# Patient Record
Sex: FEMALE | Race: BLACK OR AFRICAN AMERICAN | NOT HISPANIC OR LATINO | Employment: UNEMPLOYED | URBAN - METROPOLITAN AREA
[De-identification: names, ages, dates, MRNs, and addresses within clinical notes are randomized per-mention and may not be internally consistent; named-entity substitution may affect disease eponyms.]

---

## 2023-08-23 ENCOUNTER — TRANSFERRED RECORDS (OUTPATIENT)
Dept: HEALTH INFORMATION MANAGEMENT | Facility: CLINIC | Age: 28
End: 2023-08-23

## 2023-08-23 LAB
ALT SERPL-CCNC: 23 U/L (ref 14–59)
AST SERPL-CCNC: 13 U/L (ref 15–37)
CREATININE (EXTERNAL): 0.94 MG/DL (ref 0.55–1.3)
GLUCOSE (EXTERNAL): 183 MG/DL (ref 70–99)
POTASSIUM (EXTERNAL): 4 MMOL/L (ref 3.5–5.1)

## 2023-11-08 ENCOUNTER — TRANSFERRED RECORDS (OUTPATIENT)
Dept: HEALTH INFORMATION MANAGEMENT | Facility: CLINIC | Age: 28
End: 2023-11-08
Payer: COMMERCIAL

## 2023-11-08 ENCOUNTER — TRANSCRIBE ORDERS (OUTPATIENT)
Dept: MATERNAL FETAL MEDICINE | Facility: CLINIC | Age: 28
End: 2023-11-08
Payer: COMMERCIAL

## 2023-11-08 DIAGNOSIS — O26.90 PREGNANCY RELATED CONDITION, ANTEPARTUM: Primary | ICD-10-CM

## 2023-11-09 DIAGNOSIS — O26.90 PREGNANCY RELATED CONDITION, ANTEPARTUM: Primary | ICD-10-CM

## 2024-01-09 ENCOUNTER — PRE VISIT (OUTPATIENT)
Dept: MATERNAL FETAL MEDICINE | Facility: CLINIC | Age: 29
End: 2024-01-09
Payer: COMMERCIAL

## 2024-01-16 ENCOUNTER — OFFICE VISIT (OUTPATIENT)
Dept: MATERNAL FETAL MEDICINE | Facility: CLINIC | Age: 29
End: 2024-01-16
Attending: OBSTETRICS & GYNECOLOGY
Payer: COMMERCIAL

## 2024-01-16 ENCOUNTER — HOSPITAL ENCOUNTER (OUTPATIENT)
Dept: ULTRASOUND IMAGING | Facility: CLINIC | Age: 29
Discharge: HOME | End: 2024-01-16
Attending: OBSTETRICS & GYNECOLOGY
Payer: COMMERCIAL

## 2024-01-16 ENCOUNTER — TRANSFERRED RECORDS (OUTPATIENT)
Dept: HEALTH INFORMATION MANAGEMENT | Facility: CLINIC | Age: 29
End: 2024-01-16

## 2024-01-16 VITALS
HEART RATE: 87 BPM | OXYGEN SATURATION: 99 % | SYSTOLIC BLOOD PRESSURE: 137 MMHG | DIASTOLIC BLOOD PRESSURE: 83 MMHG | RESPIRATION RATE: 18 BRPM

## 2024-01-16 DIAGNOSIS — O26.90 PREGNANCY RELATED CONDITION, ANTEPARTUM: ICD-10-CM

## 2024-01-16 DIAGNOSIS — J45.909 ASTHMA AFFECTING PREGNANCY IN SECOND TRIMESTER: ICD-10-CM

## 2024-01-16 DIAGNOSIS — O99.512 ASTHMA AFFECTING PREGNANCY IN SECOND TRIMESTER: ICD-10-CM

## 2024-01-16 DIAGNOSIS — O99.212 OBESITY AFFECTING PREGNANCY IN SECOND TRIMESTER, UNSPECIFIED OBESITY TYPE: Primary | ICD-10-CM

## 2024-01-16 PROCEDURE — 99204 OFFICE O/P NEW MOD 45 MIN: CPT | Mod: 25 | Performed by: OBSTETRICS & GYNECOLOGY

## 2024-01-16 PROCEDURE — 99213 OFFICE O/P EST LOW 20 MIN: CPT | Mod: 25 | Performed by: OBSTETRICS & GYNECOLOGY

## 2024-01-16 PROCEDURE — 76811 OB US DETAILED SNGL FETUS: CPT

## 2024-01-16 PROCEDURE — 76811 OB US DETAILED SNGL FETUS: CPT | Mod: 26 | Performed by: OBSTETRICS & GYNECOLOGY

## 2024-01-16 RX ORDER — ALBUTEROL SULFATE 1.25 MG/3ML
1.25 SOLUTION RESPIRATORY (INHALATION) EVERY 6 HOURS PRN
COMMUNITY

## 2024-01-16 ASSESSMENT — PAIN SCALES - GENERAL: PAINLEVEL: NO PAIN (0)

## 2024-01-16 NOTE — PROGRESS NOTES
"  Maternal-Fetal Medicine Consultation    Gabby Phipps  : 1995  MRN: [id number]    REFERRAL:  Gabby Phipps is a 28 year old sent by KEVIN Johnson from [facility] for MFM consultation.    HPI:  Gabby Phipps is a 28 year old  at 22w3d by LMP consistent with 11w0d US here for MFM consultation for BMI 65, tobacco use, A1C 6.1 2023, history of tracheostomy, history of chronic hypertension but normotensive since weight loss, asthma with Albuterol inhaler.    She is here with her friend.    Patient reports that in her last pregnancy she did not gain weight until \"month 7\" at which time she gained 100 lbs.  She states that prior to that pregnancy she had lost weight, was previously 500 lb.  Notes weight has largely been stable since that time.  She states she had chronic hypertension or elevated BP in the past with higher weight but that she has been normotensive since weight loss from 500 lb.  Of note, in review of records, patient with mild range BP noted in the past including during 2020 pregnancy.   admission records also with note of hypertension.    In her first pregnancy she had 39 week full term delivery but unfortunately required  section due to arrest of descent after pushing for 3 hours.  She underwent PCS under general anesthesia due to difficulty with epidural.  She was discharged home on POD#3.  Of note, she reports a H1N1 infection in  requiring tracheostomy for 2-3 months.  States she has not had evaluation of her airway since tracheostomy and does not follow with ENT.    Patient with elevated A1C of 6.1 on 2023.  States she has not been diagnosed with diabetes and that she passed an early 1 hr GCT although this record is not available for review at this time.    Denies vaginal bleeding, LOF, pain.  Denies shortness of breath or difficulty breathing.  No additional concerns.  Does have an inhaler to use.  No recent PFTs. Planning on having repeat " " section.    Prenatal Care:  Primary OB care this pregnancy has been with [facility]    Obstetrics History:  OB History    Para Term  AB Living   2 1 1 0 0 1   SAB IAB Ectopic Multiple Live Births   0 0 0 0 1      # Outcome Date GA Lbr Elvin/2nd Weight Sex Delivery Anes PTL Lv   2 Current            1 Term 20 39w3d 13:19 / 02:30 4.14 kg (9 lb 2 oz) M CS-LTranv Gen, EPI  GABBI      Name: BETTYE BERRIOS      Apgar1: 5  Apgar5: 6     Gynecologic History:  - Last Pap: \"negative\" per outside records unknown date  - Denies any history of abnormal pap smears  - Denies prior cervical surgery or procedures  - Denies any history of frequent UTIs, vaginal infections, or STIs    Past Medical History:  Past Medical History:   Diagnosis Date    Chronic hypertension     H/O tracheostomy     Obesity     Pre-diabetes     Tobacco use      Past Surgical History:  Past Surgical History:   Procedure Laterality Date     SECTION      TRACHEOSTOMY       Current Medications:  Current Outpatient Medications   Medication Instructions    albuterol (ACCUNEB) 1.25 mg, Nebulization, EVERY 6 HOURS PRN     Allergies:  Amoxicillin    Social History:   Use of tobacco.  Denies use of alcohol, drugs.    Family History:  Denies history of genetic disorders, preeclampsia, thromboembolic disease, bleeding disorders.    ROS:  10-point ROS negative except as in HPI     PHYSICAL EXAM:  Vitals:    24 1441   BP: 137/83   BP Location: Right arm   Patient Position: Sitting   Cuff Size: Adult Large   Pulse: 87   Resp: 18   SpO2: 99%     Ultrasound:  Please see imaging tab for today's US report.    ASSESSMENT/PLAN:  Gabby Phipps is a 28 year old  at 22w3d by LMP consistent with 11w0d US here for MFM consultation for BMI 65, tobacco use, A1C 6.1 2023, history of tracheostomy, history of chronic hypertension but normotensive since weight loss, asthma with Albuterol inhaler.    BMI 65  Pregnancy complications " are increased in patients with obesity, even in the absence of additional comorbidity. These risks include, but are not limited to, gestational diabetes, hypertensive disorders of pregnancy, depression and anxiety, VTE,  birth, failed induction of labor,  delivery, operative delivery, postpartum hemorrhage, postoperative infection, subfertility, miscarriage, congenital anomaly, stillbirth, large for gestational age, shoulder dystocia, postpartum weight retention, and difficulty with breastfeeding.  The risk of antepartum stillbirth increases with increasing BMI and gestational age. Women with BMI ? 30 are less likely than their normal weight counterparts to enter spontaneous labor.  For these reasons, determining the optimal timing of delivery for these patients becomes a balance of decreasing preventable stillbirth risk with minimizing  delivery risk.  This balance is further complicated by the fact that women with BMI ? 30 are more likely to have a failed induction of labor than normal weight women.     Venous thromboembolism in pregnancy is responsible for up to 9-10% of maternal deaths in developed countries, with many of these deaths (> 90%) having an element of preventability. Obesity is certainly a risk factor for DVT throughout pregnancy, increasing risk between 2.5-3 times. Throughout pregnancy, risk for VTE is highest during times of immobility, such as surgery or trauma, and antepartum admission.  Risk is highest after delivery and remains high in the 6 weeks postpartum.  Upon admission to the hospital during pregnancy for non-delivery indications, women should undergo risk assessment for VTE within 24 hours.  Antepartum admission during pregnancy confers an 18 fold risk of VTE over baseline in otherwise low risk women, which approaches the risk of women with a high risk thrombophilia or prior VTE.  Due to this increased risk, VTE prophylaxis is recommended.  For women with BMI ?  30, and for whom admission of at least 72 hours is anticipated, mechanical prophylaxis while in bed along with maintenance of mobility is encouraged.  Pharmacologic prophylaxis is also recommended in this population.     Obstructive sleep apnea, a serious condition which is tied to metabolic syndrome and its associated conditions, is common in the obstetric population with an incidence of 11-20%.  Pregnant women with a BMI ? 30 are 13 times more likely to be diagnosed with obstructive sleep apnea than their normal weight counterparts.  Although the risks of RODRIGO are often associated with health problems later in life, multiple studies have shown an increased risk for severe  morbidity and mortality, both maternal and fetal/.  These risks include pre-eclampsia, gestational diabetes, post-operative wound complications, acute renal failure, pulmonary edema, pulmonary emboli (OR 14), peripartum cardiomyopathy (OR 19), stroke, and in hospital death (OR 7), and a trend toward fetal growth restriction.  It has been recommended in the literature to screen all women with BMI ?30 for symptoms of sleep apnea. The pregnancy specific model and STOP questions have reasonable efficacy as screening tools.     BMI >50 affects stillbirth differently compared with lower BMI.  The rate at which risk for stillbirth increased for overweight and Class I and II obesity are linear from 30-42 weeks gestation.  In contrast, among class III obesity and BMI > 50, the risks appear to be nonlinear with time; the risk of stillbirth escalates faster with increasing gestational age in these extreme classes.  These findings remained consistent after adjustment for known confounding risk factors such as chronic hypertension and pre-gestational diabetes.     Recommendations:  Screening for obstructive sleep apnea (RODRIGO) should be performed. If positive, expedited referral for polysomnography with sleep medicine should be  provided.  Advise weight gain of 11-20 pounds during pregnancy. Provide counseling on exercise and nutrition in pregnancy including 20-30 minutes of moderate exercise most days of the week.  Weight gain should be documented at each prenatal visit.   Weekly  surveillance starting at 34 weeks gestation  Pharmacologic anticoagulation should be initiated if admitted antepartum for an expected stay of 72 hours or greater (Lovenox 40 mg subcutaneous every 12 hours).  If  delivery occurs, patients should receive pharmacologic anticoagulation within 24 hours of delivery for the course of hospitalization.    Tobacco use  Tobacco use in pregnancy is associated with many risks to mother and fetus.  In addition to the better-known risks to the patient, including cardiovascular and pulmonary risks, the offspring of patients who smoke in pregnancy are at higher risk of fetal growth restriction, placental abruption, stillbirth, asthma, and increased rates of sudden infant death syndrome (SIDS).  We recommend complete cessation of tobacco use in pregnancy.  The use of pharmacotherapy to assist in smoking cessation can be considered.  Nicotine replacement therapy is recommended, but has not yet been shown to be effective in the pregnant population in limited studies.  Buproprion may also be used to aide in cessation, however there are conflicting studies regarding its association with birth defects, therefore adequate counseling should be performed prior to initiation.    Recommendations:  Continue to encourage complete smoking cessation.  Use of nicotine replacement therapy as clinically indicated  Referral to quit hotline (5-586-RJHMPZM), group therapy, or support provider.    Pre-diabetes (Hgb A1C 6.1)  Patient reports normal early 1 hr GCT.  Updated prenatal records were requested for review.     Recommendations:  Repeat 1 hr GCT assessment at 24 weeks.    History of tracheostomy  Asthma with Albuterol  inhaler  Asthma is a very common potentially serious medical complication of pregnancy.  Those with severe asthma are at the greatest risk for exacerbations and complications during pregnancy. Severe and poorly controlled asthma may be associated with increased risks of  birth, need for  delivery, preeclampsia, growth restriction, and maternal morbidity and mortality.  In general, the risk of asthma exacerbation is much greater to both mother and fetus than the risk of medication exposure.  Therefore medications should be continued as prescribed to optimize the patient s pulmonary status.      Recommendations:  Continue Albuterol  Recommend baseline PFTs  Avoid any known asthma triggers.  Influenza immunization (October to March).  Avoid prostaglandins such as E2 (Prostin) and F2-alpha (carboprost/Hemabate).  Anesthesia consult, this was placed today  Referral to ENT for assessment of airway given history of tracheostomy, this was placed today  Transfer of care to [facility] at around 28 weeks gestation in order to expedite anesthesia and ENT multidisciplinary care    History of chronic hypertension but normotensive since weight loss  Patients with chronic hypertension are more likely to develop preeclampsia during the pregnancy, with a risk of 20-50%.  Women with chronic hypertension are at increased risk for early-onset preeclampsia.  Low dose aspirin has been used to lower this risk.  Chronic hypertension also increases the risk of maternal stroke, pulmonary edema, renal failure, gestational diabetes, iatrogenic  birth, fetal growth restriction, placental abruption and  mortality rate including stillbirth.  Without baseline laboratory assessment, it may be difficult to distinguish an exacerbation of hypertension from preeclampsia, especially in the third trimester. We reviewed that it is generally anticipated that blood pressure will gradually decrease during early  pregnancy, reaching at edilberto at 28-32 weeks, and then slowly rise to pre-pregnancy levels.     We reviewed the goals of antihypertensive therapy in pregnancy, specifically there is new data to suggest that improved blood pressure control during pregnancy reduces the risk of developing superimposed preeclampsia with severe features, abruption,  delivery < 35 weeks, and fetal/ death without an increased risk of fetal growth restriction/small for gestation age infants. Based on these findings it is recommended that women be initiated on medications, prior to 20 weeks, to keep blood pressures < 140/90 mmHg both pre pregnancy and throughout gestation. We generally recommend home blood pressure monitoring to assist with medication titration as well as to monitor her for the development of preeclampsia.  In the event of new elevations in blood pressures after 20 weeks gestation, we would recommend thorough evaluation for preeclampsia prior to medication dose escalation. We reviewed the relative safety of various antihypertensive classes of medications during pregnancy. Labetalol or long-acting nifedipine safe options for blood pressure control in pregnancy.     Recommendations:  Initiate necessary medications and titrate as needed to achieve blood pressure goal <140/90  Recommend initiating low dose aspirin 81 mg daily for preeclampsia prevention  Baseline studies:   Electrocardiogram, if abnormal this should be followed up with an echocardiogram  Liver function tests, creatinine and blood urea nitrogen, serum electrolytes  Spot protein/creatinine ratio not reports in outside records available for review  If the protein/creatine ratio is greater than 0.15 it should be followed up with a 24 hour urine for protein and creatinine  Close monitoring evidence of superimposed preeclampsia  More frequent blood pressure monitoring; she should check her blood pressure weekly until 32 weeks at which point she should  check it two times per week  Her blood pressure cuff should be titrated in the office  More frequent prenatal visits are indicated with hypertension when medication modifications are necessary  Calling parameters should be clearly laid out with regards to blood pressure and symptoms  Repeat labs as clinically indicated, with a low threshold to rule-out superimposed preeclampsia, especially if it is thought that medication may need to be increased  Ultrasounds for growth monthly starting at 28 weeks gestation.   fetal surveillance with weekly BPP (or NST and MVP) starting at 32 weeks IF ON MEDICATION otherwise weekly BPP at 34 weeks for BMI.  Delivery at 38-39 weeks (unless poorly controlled or otherwise indicated sooner)  Early postpartum visit (within the first week) for blood pressure assessment  Postpartum, medication should be adjusted to maintain the systolic blood pressure below 140  mm Hg and the diastolic blood pressure below 90 mm Hg    Recommend transfer of care to [facility] at 28 weeks.  Anesthesia and ENT referrals placed today to expedite multi-disciplinary care upon transfer of care.    The patient was seen and evaluated with Dr. Morris    Thank you for allowing us to participate in the care of your patient. Please do not hesitate to contact us if you have further questions regarding the management of your patient.     Kateryna Strickland MD   Maternal-Fetal Medicine Fellow, PGY7  2024 4:26 PM      Physician Attestation   I saw this patient with the resident and agree with the resident/fellow's findings and plan of care as documented in the note.      47 MINUTES SPENT BY ME on the date of service doing chart review, history, exam, documentation & further activities per the note.    Andree Morris MD  Date of Service (when I saw the patient): 24

## 2024-01-16 NOTE — NURSING NOTE
Gabby seen in clinic today at 22w3d gestation for L2/MFM Consult d/t BMI 65, hx H1N1 with hospitalization and trach. Pt here with friend. VS obtained. Meds and allergies reviewed. Patient reports some early fetal movement, denies pain, denies contractions/pre-term labor, leaking of fluid, or bleeding. Patient denies headache, visual changes, nausea/vomiting, epigastric pain related to preeclampsia. Dr. Strickland and Dr. Morris met with pt and discussed POC, see separate note. Plan for RL2 in 3 weeks for subopt anatomy and growth. Future visits scheduled at . Pt discharged stable and ambulatory.      Rachele Fagan RN

## 2024-01-26 ENCOUNTER — TELEPHONE (OUTPATIENT)
Dept: MATERNAL FETAL MEDICINE | Facility: CLINIC | Age: 29
End: 2024-01-26
Payer: COMMERCIAL

## 2024-01-26 NOTE — TELEPHONE ENCOUNTER
LM for pt to call RN coordinator at [facility] at [phone]. Pt needs to make an appointment with ENT for airway evaluation per [facility] consult on 1/16/24. Pt can call [phone] to schedule ENT appt. May need to place new referral.       Mikki Aviles RN

## 2024-02-22 ENCOUNTER — TRANSFERRED RECORDS (OUTPATIENT)
Dept: HEALTH INFORMATION MANAGEMENT | Facility: CLINIC | Age: 29
End: 2024-02-22
Payer: COMMERCIAL

## 2024-02-28 ENCOUNTER — TELEPHONE (OUTPATIENT)
Dept: PULMONOLOGY | Facility: CLINIC | Age: 29
End: 2024-02-28

## 2024-02-28 ENCOUNTER — OFFICE VISIT (OUTPATIENT)
Dept: MATERNAL FETAL MEDICINE | Facility: CLINIC | Age: 29
End: 2024-02-28
Attending: OBSTETRICS & GYNECOLOGY
Payer: COMMERCIAL

## 2024-02-28 ENCOUNTER — HOSPITAL ENCOUNTER (OUTPATIENT)
Dept: ULTRASOUND IMAGING | Facility: CLINIC | Age: 29
Discharge: HOME | End: 2024-02-28
Attending: OBSTETRICS & GYNECOLOGY
Payer: COMMERCIAL

## 2024-02-28 DIAGNOSIS — O99.512 ASTHMA AFFECTING PREGNANCY IN SECOND TRIMESTER: ICD-10-CM

## 2024-02-28 DIAGNOSIS — J45.909 ASTHMA AFFECTING PREGNANCY IN SECOND TRIMESTER: ICD-10-CM

## 2024-02-28 DIAGNOSIS — O99.210 OBESITY AFFECTING PREGNANCY, ANTEPARTUM, UNSPECIFIED OBESITY TYPE: Primary | ICD-10-CM

## 2024-02-28 DIAGNOSIS — O26.90 PREGNANCY RELATED CONDITION, ANTEPARTUM: ICD-10-CM

## 2024-02-28 DIAGNOSIS — D62 ANEMIA DUE TO ACUTE BLOOD LOSS: ICD-10-CM

## 2024-02-28 DIAGNOSIS — E66.01 MORBID OBESITY (H): Primary | ICD-10-CM

## 2024-02-28 DIAGNOSIS — O99.210 OBESITY AFFECTING PREGNANCY, ANTEPARTUM, UNSPECIFIED OBESITY TYPE: ICD-10-CM

## 2024-02-28 PROBLEM — O47.1 FALSE LABOR AT OR AFTER 37 COMPLETED WEEKS OF GESTATION: Status: ACTIVE | Noted: 2020-07-22

## 2024-02-28 PROCEDURE — 76816 OB US FOLLOW-UP PER FETUS: CPT | Mod: 26 | Performed by: OBSTETRICS & GYNECOLOGY

## 2024-02-28 PROCEDURE — 76816 OB US FOLLOW-UP PER FETUS: CPT

## 2024-02-28 NOTE — PROGRESS NOTES
"Please see \"Imaging\" tab under \"Chart Review\" for details of today's US at the [facility].    Stone Grullon MD  Maternal-Fetal Medicine    "

## 2024-02-28 NOTE — TELEPHONE ENCOUNTER
FUTURE VISIT INFORMATION      SURGERY INFORMATION:  / high risk delivery not yet scheduled   Consult: ov 24    RECORDS REQUESTED FROM:         Most recent EKG+ Tracin24    Most recent PFT's: 24

## 2024-02-28 NOTE — NURSING NOTE
Patient reports good fetal movement, denies contractions, leaking of fluid, or bleeding.  SBAR given to MFM MD, see their note in Epic.    Spoke with Faiza MATA, PCC who will start to work to coordinate DEMIAN for patient to MFM along with coordination of multiple recommendations made in MFM consult. Patient aware she will be hearing from PCC's to arrange future visits.

## 2024-03-01 ENCOUNTER — TELEPHONE (OUTPATIENT)
Dept: PULMONOLOGY | Facility: CLINIC | Age: 29
End: 2024-03-01
Payer: COMMERCIAL

## 2024-03-04 ENCOUNTER — TRANSFERRED RECORDS (OUTPATIENT)
Dept: HEALTH INFORMATION MANAGEMENT | Facility: CLINIC | Age: 29
End: 2024-03-04
Payer: COMMERCIAL

## 2024-03-04 ENCOUNTER — TELEPHONE (OUTPATIENT)
Dept: PULMONOLOGY | Facility: CLINIC | Age: 29
End: 2024-03-04
Payer: COMMERCIAL

## 2024-03-04 LAB
ALT SERPL-CCNC: 20 U/L (ref 14–59)
AST SERPL-CCNC: 13 U/L (ref 15–37)
CREATININE (EXTERNAL): 0.79 MG/DL (ref 0.55–1.3)
GFR ESTIMATED (EXTERNAL): 105 ML/MIN/1.73

## 2024-03-04 NOTE — TELEPHONE ENCOUNTER
Called and spoke to pt to rescheduled appt with Dr Velez on 4/15 to a sooner date, per pt request due to her pregnancy. Patient has been rescheduled to 3/22 with PFT prior, holding off on chest XR for now due to pt's pregnancy and message has been routed to Dr Dan.

## 2024-03-07 ENCOUNTER — PRE VISIT (OUTPATIENT)
Dept: SURGERY | Facility: CLINIC | Age: 29
End: 2024-03-07

## 2024-03-08 NOTE — CONFIDENTIAL NOTE
RECORDS RECEIVED FROM: internal    DATE RECEIVED: 3.22.24    NOTES STATUS DETAILS   OFFICE NOTE from referring provider internal    Stone Grullon MD      OFFICE NOTE from other specialist internal  1.16.24 [facility]    MEDICATION LIST internal     TESTS     PULMONARY FUNCTION TESTING (PFT) internal  Scheduled 3.22.24

## 2024-03-10 ENCOUNTER — HEALTH MAINTENANCE LETTER (OUTPATIENT)
Age: 29
End: 2024-03-10

## 2024-03-11 ENCOUNTER — OFFICE VISIT (OUTPATIENT)
Dept: SURGERY | Facility: CLINIC | Age: 29
End: 2024-03-11
Attending: OBSTETRICS & GYNECOLOGY
Payer: COMMERCIAL

## 2024-03-11 ENCOUNTER — OFFICE VISIT (OUTPATIENT)
Dept: MATERNAL FETAL MEDICINE | Facility: CLINIC | Age: 29
End: 2024-03-11
Attending: OBSTETRICS & GYNECOLOGY
Payer: COMMERCIAL

## 2024-03-11 ENCOUNTER — PRE VISIT (OUTPATIENT)
Dept: SURGERY | Facility: CLINIC | Age: 29
End: 2024-03-11

## 2024-03-11 ENCOUNTER — ANESTHESIA EVENT (OUTPATIENT)
Dept: SURGERY | Facility: CLINIC | Age: 29
End: 2024-03-11

## 2024-03-11 VITALS
RESPIRATION RATE: 16 BRPM | SYSTOLIC BLOOD PRESSURE: 123 MMHG | WEIGHT: 293 LBS | BODY MASS INDEX: 45.99 KG/M2 | TEMPERATURE: 97.8 F | HEIGHT: 67 IN | OXYGEN SATURATION: 97 % | HEART RATE: 92 BPM | DIASTOLIC BLOOD PRESSURE: 86 MMHG

## 2024-03-11 VITALS
SYSTOLIC BLOOD PRESSURE: 123 MMHG | OXYGEN SATURATION: 97 % | WEIGHT: 293 LBS | DIASTOLIC BLOOD PRESSURE: 86 MMHG | HEART RATE: 95 BPM | RESPIRATION RATE: 18 BRPM

## 2024-03-11 DIAGNOSIS — O99.210 OBESITY AFFECTING PREGNANCY, ANTEPARTUM, UNSPECIFIED OBESITY TYPE: ICD-10-CM

## 2024-03-11 DIAGNOSIS — O99.512 ASTHMA AFFECTING PREGNANCY IN SECOND TRIMESTER: ICD-10-CM

## 2024-03-11 DIAGNOSIS — O09.93 SUPERVISION OF HIGH RISK PREGNANCY IN THIRD TRIMESTER: Primary | ICD-10-CM

## 2024-03-11 DIAGNOSIS — O26.90 PREGNANCY RELATED CONDITION, ANTEPARTUM: ICD-10-CM

## 2024-03-11 DIAGNOSIS — J45.909 ASTHMA AFFECTING PREGNANCY IN SECOND TRIMESTER: ICD-10-CM

## 2024-03-11 PROCEDURE — G0463 HOSPITAL OUTPT CLINIC VISIT: HCPCS | Performed by: ADVANCED PRACTICE MIDWIFE

## 2024-03-11 PROCEDURE — 99204 OFFICE O/P NEW MOD 45 MIN: CPT | Performed by: PHYSICIAN ASSISTANT

## 2024-03-11 PROCEDURE — 99215 OFFICE O/P EST HI 40 MIN: CPT | Performed by: ADVANCED PRACTICE MIDWIFE

## 2024-03-11 RX ORDER — ALBUTEROL SULFATE 90 UG/1
1-2 AEROSOL, METERED RESPIRATORY (INHALATION) EVERY 4 HOURS PRN
COMMUNITY
Start: 2024-01-31

## 2024-03-11 ASSESSMENT — PATIENT HEALTH QUESTIONNAIRE - PHQ9: SUM OF ALL RESPONSES TO PHQ QUESTIONS 1-9: 0

## 2024-03-11 ASSESSMENT — PAIN SCALES - GENERAL
PAINLEVEL: NO PAIN (0)
PAINLEVEL: NO PAIN (0)

## 2024-03-11 ASSESSMENT — LIFESTYLE VARIABLES: TOBACCO_USE: 1

## 2024-03-11 NOTE — PROGRESS NOTES
"Maternal fetal Medicine OB Follow up visit.     Gabby Phipps  : 1995  MRN: [id number]    CC: OB Follow-up    Subjective:  Gabby Phipps is a 28 year old  at 30w2d presenting for routine OB follow-up. Today, she is feeling well. She offers no OB concerns at this time. Patient denies regular, painful contractions, denies loss of fluid or vaginal bleeding. Reports fetal movement.      Has anesthesia consult later this afternoon. Is aware of pulmonology visits that are upcoming, but hasn't heard from ENT for the recent referral.    She is , but patient reports that her  is not currently involved. Unsure if he will be there for the birth or if she will have an additional support person.     She passed her early glucose testing and reports that she did complete routine testing at her local OB office in [place], but does not think she has been told the results.     OB Hx:  OB History    Para Term  AB Living   2 1 1 0 0 1   SAB IAB Ectopic Multiple Live Births   0 0 0 0 1      # Outcome Date GA Lbr Elvin/2nd Weight Sex Delivery Anes PTL Lv   2 Current            1 Term 20 39w3d 13:19 / 02:30 4.14 kg (9 lb 2 oz) M CS-LTranv Gen, EPI  GABBI      Name: BETTYE BERRIOS      Apgar1: 5  Apgar5: 6         Objective:  /86 (BP Location: Left arm, Patient Position: Sitting, Cuff Size: Adult Large)   Pulse 95   Resp 18   Wt (!) 182.7 kg (402 lb 12.8 oz)   LMP 2023   SpO2 97%   Gen: alert, oriented, NAD  Skin: warm, dry, intact.  Respiratory: breathing unlabored, no SOB, scar tissue noted at previous tracheostomy site  Abdominal: gravid, non-tender, FHTs: 155  Pelvic: deferred  Extremities: WNL  Psych: mood WNL, behavior WNL      OB Ultrasound:  Please see \"imaging\" tab under chart review for today's ultrasound results.      Assessment/Plan:  28 year old  at 30w2d here for follow up OB visit.    Pregnancy has been complicated by:   - Elevated BMI  - Remote " hx cHTN  - Asthma  - Current smoker  - THC use   - Pre-diabetes  - Elevated GCT   - History of tracheostomy       Transfer of care:  - Oriented patient to [facility] practice. Reviewed that [facility] is a multidisciplinary institution and her care will be collaborative in fashion with [facility] doctors, CNM, residents, fellows, and [facility] clinic for intrapartum management.     Elevated BMI:  Hx of cHTN:  - Dx with cHTN when at a higher weight (500lbs). Reports BP have become normotensive with previous weight loss.   - Baseline preE Labs: WNL. No UPC on file.  - Low dose ASA was previously recommended, however patient not taking.  - Previously recommended for RODRIGO evaluation. Will ask patient at next visit if this occurred at local medical provider or if she has interest in [facility] referral.  - ECG in clinic today. Reviewed by Dr. Harris and WNL for pregnancy.     Smoking and THC use:  - Currently smokes half PPD.  - THC use most days.  - Encouraged cessation. At upcoming visit: will need to review that after birth, baby will undergo routine screening given patient history of use during pregnancy.     Pre-diabetes:  Elevated GCT:  - Results obtained from outside clinic and are elevated. Orders at previous OB clinic (in [place]) have been placed and patient will plan to complete GTT for patient convenience given her distance from [place].  - Elevated A1c in 8/2023 of 6.1, but patient reports never being diagnosed with diabetes.     Asthma:  History of tracheostomy:  - Asthma well-controlled and not requiring frequent inhaler use. Albuterol PRN.  - Anesthesia consult after OB visit today.  - ENT referral previously given. Phone number given to patient today to schedule appointment.     Routine PNC:  - Prenatal labs:  Rh: +  antibody: negative   HepB/HIV/RPR/HepC: nonreactive   GC/CT: negative   Rubella: equivocal - recommend MMR vaccine postpartum     GCT: failed  GTT: planning to complete at local OB office  - Pap smear:  "\"negative\" (23) - per outside records, but unable to see official lab value.  - Immunizations:  s/p TDap and Flu  - GBS in urine at [facility]     Surveillance:  - Serial growth US.  - Weekly BPPs starting at 32 weeks ONLY if medications are needed for cHTN. Otherwise will begin weekly BPPs at 34 weeks for BMI.    Delivery planning:  - Planning repeat c/s at term.   - Feeding: needs to be discussed  - Contraception plans: Patient unsure if this will be her last pregnancy. Discussed r/b/a to sterilization. Given that she has state insurance, she is open to signing federal tubal papers. Plan to do this at next OB visit.        40 minutes spent on the date of the encounter, doing chart review, history and exam, documentation and further activities as noted.      Melly Morgan CNM on 3/11/2024 at 12:27 PM      "

## 2024-03-11 NOTE — CONSULTS
Anesthesia Consult Note      Reason for consult:   High Risk OB consult  History of tracheostomy  Morbid obesity    Date of Encounter: 3/11/2024  Referring Provider: Melly Morgan CNM   Primary Care Physician:  Marsha Ref-Primary, Physician      ROBBIN METZGER Moise is a 28 year old woman who is currently  who is 30w2d gestation. She is being seen in our clinic for high risk OB consult due to past medical history of morbid obesity, tobacco use, prediabetes, history of H1N1 in  with hospital course notable for tracheostomy and ECMO.     She is pregnant with her second baby and overall feels that things are going well. She is planning for a repeat  section but timing has yet to be determined. She had an appointment with [facility] earlier today.     OB Hx:       /parity:      History of complications of pregnancy  No previous pregnancy complications or first pregnancy    History of obstetrical surgery  In her first pregnancy she had 39 week full term delivery but required  section due to arrest of descent after pushing for 3 hours.  She underwent PCS under general anesthesia due to difficulty with epidural.  She was discharged home on POD#3.    History of bleeding/coagulopathy  Denies    History of anesthesia issues in patient or 1st degree relative  No previous issues with anesthesia for the patient or a first degree relative    History is obtained from the patient.     Past Medical History  Past Medical History:   Diagnosis Date    Chronic hypertension     H/O tracheostomy     H1N1 influenza 2009    Obesity     Pre-diabetes     Tobacco use        Past Surgical History  Past Surgical History:   Procedure Laterality Date     SECTION      TRACHEOSTOMY         Prior to Admission Medications  Current Outpatient Medications   Medication Sig Dispense Refill    albuterol (ACCUNEB) 1.25 MG/3ML neb solution Take 1.25 mg by nebulization every 6 hours as needed for  shortness of breath, wheezing or cough      VENTOLIN  (90 Base) MCG/ACT inhaler Inhale 1-2 puffs into the lungs every 4 hours as needed (Patient not taking: Reported on 3/11/2024)         Menstrual history: Patient's last menstrual period was 08/12/2023.     Allergies  Allergies   Allergen Reactions    Amoxicillin Hives       Social History  Social History     Socioeconomic History    Marital status:      Spouse name: Not on file    Number of children: Not on file    Years of education: Not on file    Highest education level: Not on file   Occupational History    Not on file   Tobacco Use    Smoking status: Every Day     Packs/day: 0.50     Years: 12.00     Additional pack years: 0.00     Total pack years: 6.00     Types: Cigarettes    Smokeless tobacco: Never   Substance and Sexual Activity    Alcohol use: Not Currently    Drug use: Yes     Frequency: 7.0 times per week     Types: Marijuana     Comment: daily use    Sexual activity: Yes     Partners: Male     Birth control/protection: None   Other Topics Concern    Not on file   Social History Narrative    Not on file     Social Determinants of Health     Financial Resource Strain: Not on file   Food Insecurity: Not on file   Transportation Needs: Not on file   Physical Activity: Not on file   Stress: Not on file   Social Connections: Not on file   Interpersonal Safety: Not on file   Housing Stability: Not on file       Family History  Family History   Problem Relation Age of Onset    Deep Vein Thrombosis (DVT) Mother     Anesthesia Reaction No family hx of        The complete review of systems is negative other than noted in the HPI or here. Anesthesia Evaluation   Pt has had prior anesthetic.     No history of anesthetic complications       ROS/MED HX  ENT/Pulmonary: Comment: Hx of H1N1 requiring tracheostomy 2009    (+)     RODRIGO risk factors, snores loudly, hypertension, obese,        tobacco use, Current use,                    (-) asthma and  "recent URI   Neurologic:  - neg neurologic ROS     Cardiovascular: Comment: Hx of ECMO 2009    (+)  hypertension- -   -  - -                                      METS/Exercise Tolerance: 4 - Raking leaves, gardening Comment: Walks up 3 flights of stairs every day to get to her apartment   Hematologic:  - neg hematologic  ROS     Musculoskeletal:  - neg musculoskeletal ROS     GI/Hepatic:  - neg GI/hepatic ROS     Renal/Genitourinary:  - neg Renal ROS     Endo: Comment: Prediabetes    (+)               Obesity,       Psychiatric/Substance Use:  - neg psychiatric ROS     Infectious Disease:  - neg infectious disease ROS     Malignancy:  - neg malignancy ROS     Other:              /86 (BP Location: Right arm, Patient Position: Sitting, Cuff Size: Thigh)   Pulse 92   Temp 97.8  F (36.6  C) (Oral)   Resp 16   Ht 1.702 m (5' 7\")   Wt (!) 182.7 kg (402 lb 12.5 oz)   LMP 08/12/2023   SpO2 97%   Breastfeeding No   BMI 63.08 kg/m      Physical Exam  Constitutional: Pleasant, well-appearing female, no apparent distress, and appears stated age.  Eyes: Pupils equal, round and reactive to light, extra ocular muscles intact, sclera clear, conjunctiva normal.  HENT: Normocephalic and atraumatic, oral pharynx with moist mucus membranes, good dentition. Thick neck.  Respiratory: Clear to auscultation bilaterally, no crackles or wheezing.  Cardiovascular: Regular rate and rhythm, normal S1 and S2, and no murmur noted.  No edema. Palpable pulses to radial  DP and PT arteries.   GI: Morbidly obese.  Lymph/Hematologic: No cervical lymphadenopathy and no supraclavicular lymphadenopathy.  Genitourinary:  Deferred  Skin: Warm and dry.  No rashes on exposed skin   Musculoskeletal: Full ROM of neck. There is no redness, warmth, or swelling of the visible joints. Gross motor strength is normal.    Neurologic: Awake, alert, oriented to name, place and time. Cranial nerves II-XII are grossly intact. Gait is normal. "   Neuropsychiatric: Calm, cooperative. Normal affect.       Labs / testing: (personally reviewed)    Outside records reviewed from:  Care Everywhere    Assessment  Gabby Phipps is a 28 year old female seen as a PAC referral for risk assessment and optimization for anesthesia.    Plan/Recommendations  Pt will be optimized for the proposed procedure.  See below for details on the assessment, risk, and preoperative recommendations    NEUROLOGY  - No history of TIA, CVA or seizure    -Post Op delirium risk factors:  No risk identified    HEENT  - History of tracheostomy ().   Patient was hospitalized for ~3 months for H1N1. Trach was removed 1 week prior to discharge. She has not had an ENT evaluation since, has been referred by [facility]. Agree with plan for patient to complete evaluation prior to delivery.  Airway will need to be reassessed day of surgery.  Mallampati: I  TM: > 3    Patient had general anesthesia during  for her first pregnancy 2020  ETT Placement Date: 20; Placement Time: 0215 (created via procedure documentation); Mask Ventilation: Easy mask; Type: Standard ETT; Single Lumen Tube Size: 7 mm; Cuffed: Yes; Blade Size: Palacio 2; Location: Oral; Removal Date: 20; Removal Time: 0249       CARDIAC  - No history of CAD and Afib  - Hypertension  History of chronic HTN noted on chart review. Patient is not currently on antihypertensives. BP WNL today and on review of last few readings, BP appears to be well controlled at this time.    - METS (Metabolic Equivalents)  Patient performs 4 or more METS exercise without symptoms            Total Score: 0      RCRI-Very low risk: Class 1 0.4% complication rate            Total Score: 0        PULMONARY  - Obstructive Sleep Apnea  RODRIGO risk with no formal diagnosis  RODRIGO Medium Risk          Total Score: 4   RODRIGO: Snores loudly   RODRIGO: Hypertension    RODRIGO: BMI over 35 kg/m2    RODRIGO: Neck Circum >16 in        - Denies asthma or inhaler use  -  "Tobacco History    History   Smoking Status    Every Day    Packs/day: 0.50    Years: 12.00    Types: Cigarettes   Smokeless Tobacco    Never       GI    PONV Medium Risk  Total Score: 2           1 AN PONV: Pt is Female    1 AN PONV: Intended Post Op Opioids        /RENAL  - Baseline Creatinine  0.79    ENDOCRINE   - BMI: Estimated body mass index is 63.08 kg/m  as calculated from the following:    Height as of this encounter: 1.702 m (5' 7\").    Weight as of this encounter: 182.7 kg (402 lb 12.5 oz).  Class 3 Obesity (BMI > 40)  - Prediabetes  Last A1c 8/2023 was 6.1    HEME  VTE Medium Risk 1.8%            Total Score: 5    VTE: BMI greater than 39    VTE: Family Hx of VTE      - No history of abnormal bleeding or antiplatelet use.      Tentative Obstetrical Anesthesia Treatment plan developed in collaboration with the attending anesthesiologist, Dr Li.        On the day of service:     Prep time: 17 minutes  Visit time: 15 minutes  Documentation time: 20 minutes  ------------------------------------------  Total time: 52 minutes    Sonia Angel PA-C    Preoperative Assessment Center  [facility]  Office phone: [phone]  Fax: [phone]    "

## 2024-03-11 NOTE — NURSING NOTE
Gabby seen in clinic today for First OB visit at 30w2d gestation. VSS. Pt reports positive fetal movement, see flowsheet. Pt evaluated for  labor, preeclampsia, vaginal bleeding, infection, fetal wellbeing without concerns. Pt denies bleeding/lof/change in vaginal discharge/contractions/headache/vision changes/chest pain/SOB/edema. Pt notified to review new pt education in AVS, verbally reviewed highlights. NICK Morgan CNM met with pt and discussed POC. Pt noted to have elevated 1 hour glucose. 3 hour glucose ordered per former clinic in [place]. Pt called and encouraged to complete 3 hour glucose within the next week. Pt verbalized understanding. Pt also given phone number for ENT and encouraged to call and make appt with them as soon as possible. Plan for RL2/OBV in two weeks- already scheduled. Pt discharged stable and ambulatory.      Rachele Fagan RN

## 2024-03-11 NOTE — PATIENT INSTRUCTIONS
"Weeks 30 to 32 of Your Pregnancy: Care Instructions  Your baby is growing more every day. Its eyes can open and close, and it may have hair on its head. Your baby may sleep 20 to 45 minutes at a time and is more active at certain times.    You should feel your baby move several times every day. Your baby now turns less and kicks more.   This is a good time to tour your hospital or birthing center. You may also want to find childcare if needed.     To ease heartburn    Avoid foods that make your symptoms worse, such as chocolate, spicy foods, and caffeine.  Avoid bending over or lying down after meals.  Do not eat for 2 hours before bedtime.  Take antacids like Tums, but don't take ones that have sodium bicarbonate, magnesium trisilicate, or aspirin.    To care for large, swollen veins (varicose veins)    Try to avoid standing for long periods of time.  Sit with your feet propped up.  Wear support hose.  Get some exercise every day, like walking or swimming.  Counting your baby's kicks  Your doctor may ask you to count your baby's movements, such as kicks, flutters, or rolls.    Find a quiet place, and get comfortable. Write down your start time. Count your baby's movements (except hiccups). When your baby has moved 10 times, you can stop counting. Write down how many minutes it took.   If an hour goes by and you don't feel 10 movements, have something to eat or drink. Count for another hour. If you don't feel at least 10 movements in the 2-hour period, call your doctor.   Follow-up care is a key part of your treatment and safety. Be sure to make and go to all appointments, and call your doctor if you are having problems. It's also a good idea to know your test results and keep a list of the medicines you take.  Where can you learn more?  Go to https://www.[employer] .fr [employer]wise.net/patiented  Enter X471 in the search box to learn more about \"Weeks 30 to 32 of Your Pregnancy: Care Instructions.\"  Current as of: July 11, " "2023               Content Version: 13.8    1160-2643 [employer].   Care instructions adapted under license by your healthcare professional. If you have questions about a medical condition or this instruction, always ask your healthcare professional. [employer] disclaims any warranty or liability for your use of this information.      Learning About Birth Control After Childbirth  What is birth control?  Birth control is any method used to prevent pregnancy. If you have vaginal sex without birth control, you could get pregnant--even if you haven't started having periods again. You're less likely to get pregnant while breastfeeding, but it's still possible. Finding birth control that works for you can help avoid an unplanned pregnancy.  There are many kinds of birth control. Each has pros and cons. Find what works for you. Talk to your doctor if you've just given birth or are breastfeeding.    Long-acting reversible contraception (LARC). These are placed inside your body by a doctor. They can prevent pregnancy for years.  Examples include:  An implant (hormonal).  Copper intrauterine device (IUD).  Hormonal IUDs.   Short-acting hormonal methods. These release hormones. Examples include:  Combination birth control pills (\"the pill\").  Skin patches.  A vaginal ring.  A shot.  Mini-pills. Choose progestin-only options soon after giving birth.     Barrier methods. Use these every time you have vaginal sex.  Examples include:  External (male) condoms.  Internal (female) condoms.  Diaphragms.  Cervical caps.  Sponges.   Spermicides. These kill sperm or stop sperm from moving. They can be gels, creams, foams, films, or tablets. Use them before vaginal sex.  Examples include:  Nonoxynol-9.  pH regulator gel.     Permanent birth control (sterilization). This can be an option if you're sure that you don't want to get pregnant later.  Examples include:  Vasectomy.  Having tubes tied (tubal ligation). " "  Emergency contraception. This is a backup method. Use it if you didn't use birth control or your birth control method failed.  Examples include:  Copper and hormonal IUDs.  Emergency contraceptive pills.     Fertility awareness. You'll learn when you are most likely to become pregnant (are fertile). You can avoid vaginal sex at that time.  It's also called:  Natural family planning.  The rhythm method.   Breastfeeding. This is most effective when all of these are true:  Your baby is younger than 6 months old.  You're breastfeeding and not bottle-feeding at all.  You aren't having periods.   Follow-up care is a key part of your treatment and safety. Be sure to make and go to all appointments, and call your doctor if you are having problems. It's also a good idea to know your test results and keep a list of the medicines you take.  Where can you learn more?  Go to https://www.[employer].net/patiented  Enter X408 in the search box to learn more about \"Learning About Birth Control After Childbirth.\"  Current as of: July 11, 2023               Content Version: 13.8    5238-6995 [employer].   Care instructions adapted under license by your healthcare professional. If you have questions about a medical condition or this instruction, always ask your healthcare professional. [employer] disclaims any warranty or liability for your use of this information.      "

## 2024-03-14 ENCOUNTER — TRANSFERRED RECORDS (OUTPATIENT)
Dept: HEALTH INFORMATION MANAGEMENT | Facility: CLINIC | Age: 29
End: 2024-03-14
Payer: COMMERCIAL

## 2024-03-22 ENCOUNTER — OFFICE VISIT (OUTPATIENT)
Dept: PULMONOLOGY | Facility: CLINIC | Age: 29
End: 2024-03-22
Attending: OBSTETRICS & GYNECOLOGY
Payer: COMMERCIAL

## 2024-03-22 ENCOUNTER — PRE VISIT (OUTPATIENT)
Dept: PULMONOLOGY | Facility: CLINIC | Age: 29
End: 2024-03-22

## 2024-03-22 ENCOUNTER — ANCILLARY PROCEDURE (OUTPATIENT)
Dept: GENERAL RADIOLOGY | Facility: CLINIC | Age: 29
End: 2024-03-22
Attending: INTERNAL MEDICINE
Payer: COMMERCIAL

## 2024-03-22 VITALS
BODY MASS INDEX: 44.41 KG/M2 | HEIGHT: 68 IN | DIASTOLIC BLOOD PRESSURE: 84 MMHG | OXYGEN SATURATION: 96 % | WEIGHT: 293 LBS | SYSTOLIC BLOOD PRESSURE: 142 MMHG | HEART RATE: 97 BPM

## 2024-03-22 DIAGNOSIS — O26.90 PREGNANCY RELATED CONDITION, ANTEPARTUM: ICD-10-CM

## 2024-03-22 DIAGNOSIS — R06.09 DOE (DYSPNEA ON EXERTION): ICD-10-CM

## 2024-03-22 DIAGNOSIS — O99.210 OBESITY AFFECTING PREGNANCY, ANTEPARTUM, UNSPECIFIED OBESITY TYPE: ICD-10-CM

## 2024-03-22 DIAGNOSIS — O99.512 ASTHMA AFFECTING PREGNANCY IN SECOND TRIMESTER: ICD-10-CM

## 2024-03-22 DIAGNOSIS — R94.2 DIFFUSION CAPACITY OF LUNG (DL), DECREASED: Primary | ICD-10-CM

## 2024-03-22 DIAGNOSIS — J45.909 ASTHMA AFFECTING PREGNANCY IN SECOND TRIMESTER: ICD-10-CM

## 2024-03-22 PROCEDURE — 71045 X-RAY EXAM CHEST 1 VIEW: CPT | Performed by: RADIOLOGY

## 2024-03-22 PROCEDURE — 94375 RESPIRATORY FLOW VOLUME LOOP: CPT | Performed by: INTERNAL MEDICINE

## 2024-03-22 PROCEDURE — G0463 HOSPITAL OUTPT CLINIC VISIT: HCPCS | Performed by: INTERNAL MEDICINE

## 2024-03-22 PROCEDURE — 99205 OFFICE O/P NEW HI 60 MIN: CPT | Mod: 25 | Performed by: INTERNAL MEDICINE

## 2024-03-22 PROCEDURE — 94726 PLETHYSMOGRAPHY LUNG VOLUMES: CPT | Performed by: INTERNAL MEDICINE

## 2024-03-22 PROCEDURE — 94729 DIFFUSING CAPACITY: CPT | Performed by: INTERNAL MEDICINE

## 2024-03-22 NOTE — PROGRESS NOTES
"    Referring provider: Stone Grullon     Gabby Phipps is a 28 year old female with hx of obesity, Pre diabetes, Asthma, H1N1 c/b temporary Tracheostomy and ECMO (),  currently on her third trimester presents to pulmonary clinic for evaluation of asthma.   Patient reports feeling at baseline functional status. Able to walk up 3 flights of stairs to get to her house. Also in the past few years held jobs that involved her standing for several hours. Denies shortness of breath, chest tightness or nocturnal symptoms. Reports only using albuterol when she falls sick or becomes symptomatic. Reports she has always been overweight but in the past 6 months has lost 40-50 lb. Current smoker but reduced usage from prior. Coughs every day clear to white sputum. Reports coughing since tracheostomy was placed. Feels like she has something \" stuck in her throat\". Sputum changes color when she gets sick.      Soc hx   Walks up 3 flights of stairs to go homes. Works jobs where she needs to stay up on her feet. Currently only uses albuterol and has only needed it when sick.  0.5 pk a day smoker reduced from 2 pk a day. ( 12 year period)  Does not drink alcohol.   Smokes marjuana as well but reduced quantity in the setting of being pregnant. ( 10 year total period)    Surg hx Ankle surgery , C section for the first pregnancy, tracheostomy ( 3 months)     Family hx: Non contributory. Mom - breast cancer. Grand mother - melanoma Grandfather - prostate cancer.       Objective : Reviewed vitals on chart   Physical exam:   General: overall well appearing. Some pauses during conversation to take in breaths   HEENT: EOMi NC AT   Card: RRR   Pulm: Vesicular breath sound bilaterally   Abdomen: Significant pannus present         Assessment and plan       Gabby Phipps is a 28 year old female with hx of obesity, Pre diabetes, Asthma, H1N1 c/b temporary Tracheostomy and ECMO (),  currently on her third trimester presents to " pulmonary clinic for evaluation of asthma.     Patient has well controlled respiratory symptoms related symptoms throughout her pregnancy. PFT indicate a negative bronchodilator response. PFT indicate a restrictive pattern ( reduced FVC and TLC). DLCO which would be expected to be high in pregnancy is however inappropriately normal. In total these findings are concerning for a developing Post viral ( Either H1N1 or COVID) ILD disease process. HR CT would be the next step. Given pregnancy will start with CXR. If fibrosis is advanced it will be evident on CXR. Post partum will see the patient back in clinic for CT.     - CXR now   - Asthma diagnosis at this time is uncertain. Even if she does have it, the symptom burden is low and should not impact delivery.   - Will bring patient back to clinic in 6 months will HR- CT     Above plan discussed with Dr.Kubbara Jewels Beck   PGY 2 IM resident

## 2024-03-22 NOTE — NURSING NOTE
Chief Complaint   Patient presents with    New Patient     New Asthma      Vitals were taken and medications were reconciled.     Manuela Posadas RMA  3:18 PM

## 2024-03-22 NOTE — LETTER
"    3/22/2024         RE: Gabby Phipps  [address]  [facility] [address]        Dear Colleague,    Thank you for referring your patient, Gabby Phipps, to the [facility] [place]. Please see a copy of my visit note below.        Referring provider: Stone Guerrero Phipps is a 28 year old female with hx of obesity, Pre diabetes, Asthma, H1N1 c/b temporary Tracheostomy and ECMO (),  currently on her third trimester presents to pulmonary clinic for evaluation of asthma.   Patient reports feeling at baseline functional status. Able to walk up 3 flights of stairs to get to her house. Also in the past few years held jobs that involved her standing for several hours. Denies shortness of breath, chest tightness or nocturnal symptoms. Reports only using albuterol when she falls sick or becomes symptomatic. Reports she has always been overweight but in the past 6 months has lost 40-50 lb. Current smoker but reduced usage from prior. Coughs every day clear to white sputum. Reports coughing since tracheostomy was placed. Feels like she has something \" stuck in her throat\". Sputum changes color when she gets sick.      Soc hx   Walks up 3 flights of stairs to go homes. Works jobs where she needs to stay up on her feet. Currently only uses albuterol and has only needed it when sick.  0.5 pk a day smoker reduced from 2 pk a day. ( 12 year period)  Does not drink alcohol.   Smokes marjuana as well but reduced quantity in the setting of being pregnant. ( 10 year total period)    Surg hx Ankle surgery , C section for the first pregnancy, tracheostomy ( 3 months)     Family hx: Non contributory. Mom - breast cancer. Grand mother - melanoma Grandfather - prostate cancer.       Objective : Reviewed vitals on chart   Physical exam:   General: overall well appearing. Some pauses during conversation to take in breaths   HEENT: EOMi NC AT   Card: " RRR   Pulm: Vesicular breath sound bilaterally   Abdomen: Significant pannus present         Assessment and plan       Gabby Phipps is a 28 year old female with hx of obesity, Pre diabetes, Asthma, H1N1 c/b temporary Tracheostomy and ECMO (),  currently on her third trimester presents to pulmonary clinic for evaluation of asthma.     Patient has well controlled respiratory symptoms related symptoms throughout her pregnancy. PFT indicate a negative bronchodilator response. PFT indicate a restrictive pattern ( reduced FVC and TLC). DLCO which would be expected to be high in pregnancy is however inappropriately normal. In total these findings are concerning for a developing Post viral ( Either H1N1 or COVID) ILD disease process. HR CT would be the next step. Given pregnancy will start with CXR. If fibrosis is advanced it will be evident on CXR. Post partum will see the patient back in clinic for CT.     - CXR now   - Asthma diagnosis at this time is uncertain. Even if she does have it, the symptom burden is low and should not impact delivery.   - Will bring patient back to clinic in 6 months will HR- CT     Above plan discussed with Dr.Kubbara Jewels Bekc   PGY 2 IM resident       Keira Dan MD

## 2024-03-22 NOTE — PATIENT INSTRUCTIONS
For clinical questions, please call our nursing line [phone]    For scheduling, please call [phone]

## 2024-03-27 ENCOUNTER — OFFICE VISIT (OUTPATIENT)
Dept: MATERNAL FETAL MEDICINE | Facility: CLINIC | Age: 29
End: 2024-03-27
Attending: ADVANCED PRACTICE MIDWIFE
Payer: COMMERCIAL

## 2024-03-27 ENCOUNTER — OFFICE VISIT (OUTPATIENT)
Dept: MATERNAL FETAL MEDICINE | Facility: CLINIC | Age: 29
End: 2024-03-27
Attending: OBSTETRICS & GYNECOLOGY
Payer: COMMERCIAL

## 2024-03-27 ENCOUNTER — LAB (OUTPATIENT)
Dept: LAB | Facility: CLINIC | Age: 29
End: 2024-03-27
Attending: OBSTETRICS & GYNECOLOGY
Payer: COMMERCIAL

## 2024-03-27 ENCOUNTER — HOSPITAL ENCOUNTER (OUTPATIENT)
Dept: ULTRASOUND IMAGING | Facility: CLINIC | Age: 29
Discharge: HOME | End: 2024-03-27
Attending: OBSTETRICS & GYNECOLOGY
Payer: COMMERCIAL

## 2024-03-27 VITALS
OXYGEN SATURATION: 98 % | RESPIRATION RATE: 20 BRPM | HEART RATE: 98 BPM | BODY MASS INDEX: 62.43 KG/M2 | SYSTOLIC BLOOD PRESSURE: 126 MMHG | DIASTOLIC BLOOD PRESSURE: 75 MMHG | WEIGHT: 293 LBS

## 2024-03-27 DIAGNOSIS — O24.410 DIET CONTROLLED GESTATIONAL DIABETES MELLITUS (GDM) IN THIRD TRIMESTER: ICD-10-CM

## 2024-03-27 DIAGNOSIS — R03.0 ELEVATED BP WITHOUT DIAGNOSIS OF HYPERTENSION: ICD-10-CM

## 2024-03-27 DIAGNOSIS — O99.210 OBESITY AFFECTING PREGNANCY, ANTEPARTUM, UNSPECIFIED OBESITY TYPE: ICD-10-CM

## 2024-03-27 DIAGNOSIS — O09.93 SUPERVISION OF HIGH RISK PREGNANCY IN THIRD TRIMESTER: Primary | ICD-10-CM

## 2024-03-27 DIAGNOSIS — O99.210 OBESITY IN PREGNANCY: ICD-10-CM

## 2024-03-27 DIAGNOSIS — O99.210 OBESITY DURING PREGNANCY: Primary | ICD-10-CM

## 2024-03-27 LAB
ALBUMIN MFR UR ELPH: 17.6 MG/DL
ALT SERPL W P-5'-P-CCNC: 12 U/L (ref 0–50)
AST SERPL W P-5'-P-CCNC: 13 U/L (ref 0–45)
CREAT SERPL-MCNC: 0.71 MG/DL (ref 0.51–0.95)
CREAT UR-MCNC: 188.3 MG/DL
DLCOUNC-%PRED-PRE: 79 %
DLCOUNC-PRE: 19.02 ML/MIN/MMHG
DLCOUNC-PRED: 23.84 ML/MIN/MMHG
EGFRCR SERPLBLD CKD-EPI 2021: >90 ML/MIN/1.73M2
ERV-%PRED-PRE: 29 %
ERV-PRE: 0.41 L
ERV-PRED: 1.37 L
ERYTHROCYTE [DISTWIDTH] IN BLOOD BY AUTOMATED COUNT: 12.7 % (ref 10–15)
EXPTIME-PRE: 3.71 SEC
FEF2575-%PRED-POST: 84 %
FEF2575-%PRED-PRE: 70 %
FEF2575-POST: 3.15 L/SEC
FEF2575-PRE: 2.63 L/SEC
FEF2575-PRED: 3.73 L/SEC
FEFMAX-%PRED-PRE: 79 %
FEFMAX-PRE: 5.91 L/SEC
FEFMAX-PRED: 7.45 L/SEC
FEV1-%PRED-PRE: 68 %
FEV1-PRE: 2.27 L
FEV1FEV6-PRE: 86 %
FEV1FEV6-PRED: 86 %
FEV1FVC-PRE: 86 %
FEV1FVC-PRED: 86 %
FEV1SVC-PRE: 87 %
FEV1SVC-PRED: 83 %
FIFMAX-PRE: 3.55 L/SEC
FRCPLETH-%PRED-PRE: 68 %
FRCPLETH-PRE: 1.93 L
FRCPLETH-PRED: 2.84 L
FVC-%PRED-PRE: 68 %
FVC-PRE: 2.65 L
FVC-PRED: 3.89 L
HCT VFR BLD AUTO: 40 % (ref 35–47)
HGB BLD-MCNC: 13.4 G/DL (ref 11.7–15.7)
IC-%PRED-PRE: 80 %
IC-PRE: 2.21 L
IC-PRED: 2.75 L
MCH RBC QN AUTO: 30.9 PG (ref 26.5–33)
MCHC RBC AUTO-ENTMCNC: 33.5 G/DL (ref 31.5–36.5)
MCV RBC AUTO: 92 FL (ref 78–100)
PLATELET # BLD AUTO: 247 10E3/UL (ref 150–450)
PROT/CREAT 24H UR: 0.09 MG/MG CR (ref 0–0.2)
RBC # BLD AUTO: 4.33 10E6/UL (ref 3.8–5.2)
RVPLETH-%PRED-PRE: 100 %
RVPLETH-PRE: 1.53 L
RVPLETH-PRED: 1.52 L
TLCPLETH-%PRED-PRE: 76 %
TLCPLETH-PRE: 4.14 L
TLCPLETH-PRED: 5.44 L
VA-%PRED-PRE: 63 %
VA-PRE: 3.39 L
VC-%PRED-PRE: 64 %
VC-PRE: 2.61 L
VC-PRED: 4.02 L
WBC # BLD AUTO: 12.4 10E3/UL (ref 4–11)

## 2024-03-27 PROCEDURE — 82565 ASSAY OF CREATININE: CPT

## 2024-03-27 PROCEDURE — 36415 COLL VENOUS BLD VENIPUNCTURE: CPT

## 2024-03-27 PROCEDURE — 84450 TRANSFERASE (AST) (SGOT): CPT

## 2024-03-27 PROCEDURE — 76819 FETAL BIOPHYS PROFIL W/O NST: CPT

## 2024-03-27 PROCEDURE — 76819 FETAL BIOPHYS PROFIL W/O NST: CPT | Mod: 26 | Performed by: OBSTETRICS & GYNECOLOGY

## 2024-03-27 PROCEDURE — 84460 ALANINE AMINO (ALT) (SGPT): CPT

## 2024-03-27 PROCEDURE — 99215 OFFICE O/P EST HI 40 MIN: CPT | Mod: 25 | Performed by: ADVANCED PRACTICE MIDWIFE

## 2024-03-27 PROCEDURE — 84156 ASSAY OF PROTEIN URINE: CPT

## 2024-03-27 PROCEDURE — 76816 OB US FOLLOW-UP PER FETUS: CPT | Mod: 26 | Performed by: OBSTETRICS & GYNECOLOGY

## 2024-03-27 PROCEDURE — 76816 OB US FOLLOW-UP PER FETUS: CPT

## 2024-03-27 PROCEDURE — 85027 COMPLETE CBC AUTOMATED: CPT

## 2024-03-27 PROCEDURE — G0463 HOSPITAL OUTPT CLINIC VISIT: HCPCS | Mod: 25 | Performed by: ADVANCED PRACTICE MIDWIFE

## 2024-03-27 RX ORDER — GLUCOSAMINE HCL/CHONDROITIN SU 500-400 MG
CAPSULE ORAL
Qty: 100 EACH | Refills: 3 | Status: SHIPPED | OUTPATIENT
Start: 2024-03-27

## 2024-03-27 RX ORDER — LANCETS
EACH MISCELLANEOUS
Qty: 100 EACH | Refills: 6 | Status: SHIPPED | OUTPATIENT
Start: 2024-03-27

## 2024-03-27 ASSESSMENT — PAIN SCALES - GENERAL: PAINLEVEL: NO PAIN (0)

## 2024-03-27 NOTE — PROGRESS NOTES
"Please see \"Imaging\" tab under \"Chart Review\" for details of today's visit.    Ana M Harris    "

## 2024-03-27 NOTE — PATIENT INSTRUCTIONS
"Gestational Diabetes:  You will check your blood sugar 4 times a day for the remainder of your pregnancy. Once fasting, piror to eating breakfast, and one hour after meals. Please keep a log of these values and you can also write down what you eat at those meals to review with the diabetic educator who will be reaching out to you.     Here are the goal values for your blood sugar in pregnancy:  Fastin or less  1-hr after meals: 140 or less  2-hr after meals: 120 or less  Weeks 32 to 34 of Your Pregnancy: Care Instructions    Decide whether you want to bank or donate your baby's umbilical cord blood. If you want to save this blood, you have to arrange for it ahead of time.    Decide about circumcision. Personal, Tenriism, or cultural beliefs may play a role in your decision. You get to decide what you want for your baby.    Learn how to ease hemorrhoids.    Get more liquids, fruits, vegetables, and fiber in your diet.  Avoid sitting for too long.  Clean yourself with moist toilet paper. Or try witch hazel pads.  Try ice packs or warm sitz baths for discomfort.  Use hydrocortisone cream for pain or itching.  Ask your doctor about stool softeners.    Consider the benefits of breastfeeding.    It reduces your baby's risk of sudden infant death syndrome (SIDS).   babies are less likely to get certain infections. And they're less likely to be obese or get diabetes later in life.  It can lower your risk of breast and ovarian cancers and osteoporosis.  It saves you money.  Follow-up care is a key part of your treatment and safety. Be sure to make and go to all appointments, and call your doctor if you are having problems. It's also a good idea to know your test results and keep a list of the medicines you take.  Where can you learn more?  Go to https://www.healthwise.net/patiented  Enter X711 in the search box to learn more about \"Weeks 32 to 34 of Your Pregnancy: Care Instructions.\"  Current as of: July 10, " 2023               Content Version: 14.0    9066-4889 [employer].   Care instructions adapted under license by your healthcare professional. If you have questions about a medical condition or this instruction, always ask your healthcare professional. [employer] disclaims any warranty or liability for your use of this information.      You have been provided the Any Day Now: Early Labor at Home document.    Additional copies can be found here:  [email or web address]  You have been provided the What I'd Wish I'd Known About Giving Birth document.    Additional copies can be found here:  [email or web address]

## 2024-03-27 NOTE — NURSING NOTE
Gabby seen in clinic today for F/U OB visit and F/U Comp/BPP at 32w4d gestation. VS WNL. Pt reports + fetal movement, see flowsheet. Pt evaluated for  labor, preeclampsia, vaginal bleeding, infection, fetal wellbeing without concerns. Pt denies bleeding/lof/change in vaginal discharge/contractions/headache/vision changes/chest pain/SOB/edema. Pt notified to review new pt education in AVS, verbally reviewed highlights. KRISTINE Morgan met with pt and discussed POC. Plan for pt to have weekly Bpp's and to get connected with Diabetic Ed, Pt scheduled for repeat C/S and BTL on  at 10:30 am. Pt was scheduled for meet and greet with Dr. Farmer on  at 9:30 am video Pt scheduled for weekly BPP's and weekly OBV. . Future visits scheduled at . Pt discharged stable and ambulatory.

## 2024-03-27 NOTE — PROGRESS NOTES
"Maternal fetal Medicine OB Follow up visit.     Gabby Phipps  : 1995  MRN: [id number]    CC: OB Follow-up    Subjective:  Gabby Phipps is a 28 year old  at 32w4d presenting for routine OB follow-up. Today, she is here with her niece and is feeling well. She states she completed her GTT in [place] and was told she did not pass. There have not been follow up plans made with any diabetic ed team there. She is agreeable to following with [facility] given that she has transferred care here.     She also had her visit with pulmonology and was told that they don't believe she carries a dx of asthma, which she was very surprised to learn. She has plans to follow up with them after pregnancy. She does note that her BP was elevated at that visit. Denies headaches or changes in vision, RUQ pain.    She reports occasional uterine tightening, denies loss of fluid or vaginal bleeding. Reports fetal movement. She is agreeable to signing federal tubal papers today, but is still not certain she desires that procedure, but is happy to have the option.     Follow up with ENT has not yet occurred.       OB Hx:  OB History    Para Term  AB Living   2 1 1 0 0 1   SAB IAB Ectopic Multiple Live Births   0 0 0 0 1      # Outcome Date GA Lbr Elvin/2nd Weight Sex Delivery Anes PTL Lv   2 Current            1 Term 20 39w3d 13:19 / 02:30 4.14 kg (9 lb 2 oz) M CS-LTranv Gen, EPI  GABBI      Name: BETTYE BERRIOS      Apgar1: 5  Apgar5: 6         Objective:  /75 (BP Location: Left arm, Patient Position: Sitting, Cuff Size: Adult Large)   Pulse 98   Resp 20   Wt (!) 183.5 kg (404 lb 9.6 oz)   LMP 2023   SpO2 98%   BMI 62.43 kg/m    Gen: alert, oriented, NAD  Skin: warm, dry, intact  Respiratory: breathing unlabored, no SOB  Abdominal: gravid, non-tender  Pelvic: deferred  Extremities: WNL  Psych: mood WNL, behavior WNL      OB Ultrasound:  Please see \"imaging\" tab under chart review for " today's ultrasound results.      Assessment/Plan:  28 year old  at 32w4d here for follow up OB visit.    Pregnancy has been complicated by:   - Elevated BMI  - Remote hx cHTN   - mild-rang BP x1 at 31wks  - Asthma  - Current smoker  - THC use   - Pre-diabetes  - GDM  - History of tracheostomy              Elevated BMI:  Remote hx of cHTN (normotensive since weight loss):  Mild-range BP x1 in pregnancy:  - Dx with cHTN when at a higher weight (500lbs). BP has become normotensive with previous weight loss.   - Baseline preE Labs: WNL. No UPC on file.  - Low dose ASA was previously recommended, however patient not taking.  - Previously recommended for RODRIGO evaluation.  - ECG WNL for pregnancy.   - Mild-range BP on 3/22 at pulmonology office. Asymptomatic of s/s of preE at this time and updated labs WNL today.      Smoking and THC use:  - Currently smokes half PPD.  - THC use most days.  - Encouraged cessation.   - Reviewed that a drug screening for patient will be recommended on admission to the hospital, and that her baby will be screened as well. Social work will likely plan to see patient while hospitalized as well.       GDMA1:  - Elevated A1c in 2023 of 6.1   - GTT at outside clinic results obtained and elevated: 91, 189, 179, 86.  - Diabetic ed referral placed.  - Testing supplies sent to pharmacy and reviewed how to begin testing blood sugar levels 4x per day. Written parameters sent via AVS.      Asthma:  History of tracheostomy ():  - Asthma well-controlled and not requiring frequent inhaler use. Albuterol PRN.  - Anesthesia consult on 3/11 in agreement with evaluation by ENT and with plans for airway assessment day of surgery.   - ENT referral previously placed. Unfortunately, there are no openings available until September. Will reach out to her local clinic to see if they can place an ENT referral that may have better availability for assessment prior to delivery.     Routine PNC:  - Prenatal  "labs:  Rh: +  antibody: negative               HepB/HIV/RPR/HepC: nonreactive               GC/CT: negative               Rubella: equivocal - recommend MMR vaccine postpartum                 GCT: failed  GTT: failed  - Pap smear: \"negative\" (23) - per outside records, but unable to see official lab result.  - Immunizations:  s/p TDap and Flu  - GBS in urine at [facility]      Surveillance:  - Serial growth US.  - Weekly BPPs starting at 32 weeks now in the setting of GDM. Discussed twice weekly recommendation if insulin is required as pregnancy advances.      Delivery planning:  - Given hx of cHTN without medications, will recommend delivery at 38w0d. Sooner if clinically indicated.  - c/s scheduled for . Will assist with scheduling an pre-op planning visit with Dr. Farmer who is on call day that day given hx of tracheostomy and BMI.  - Feeding: needs to be discussed  - Contraception plans: federal tubal papers signed and scanned into chart. Unsure if she desires sterilization, but will have a decision by the time of delivery.     RTC in 1 week    45 minutes spent on the date of the encounter, doing chart review, history and exam, documentation and further activities as noted.      Melly Morgan CNM on 3/27/2024 at 12:07 PM      "

## 2024-03-27 NOTE — TELEPHONE ENCOUNTER
FUTURE VISIT INFORMATION      SURGERY INFORMATION:  repeat  and no date or provider at this time   Consult: ov 3/27/24    RECORDS REQUESTED FROM:       Most recent EKG+ Tracing: 3/13/24

## 2024-03-29 ENCOUNTER — VIRTUAL VISIT (OUTPATIENT)
Dept: EDUCATION SERVICES | Facility: CLINIC | Age: 29
End: 2024-03-29
Payer: COMMERCIAL

## 2024-03-29 ENCOUNTER — PRE VISIT (OUTPATIENT)
Dept: MATERNAL FETAL MEDICINE | Facility: CLINIC | Age: 29
End: 2024-03-29

## 2024-03-29 DIAGNOSIS — O24.410 DIET CONTROLLED GESTATIONAL DIABETES MELLITUS (GDM) IN THIRD TRIMESTER: Primary | ICD-10-CM

## 2024-03-29 PROCEDURE — 97802 MEDICAL NUTRITION INDIV IN: CPT | Mod: 95 | Performed by: DIETITIAN, REGISTERED

## 2024-03-29 NOTE — LETTER
"    3/29/2024         RE: Gabby Phipps  [address]  [place] [address]        Dear Colleague,    Thank you for referring your patient, Gabby Phipps, to the [facility]. Please see a copy of my visit note below.    Diabetes Self-Management Education & Support  Type of Service: Telephone Visit    Originating Location (Patient Location): Home  Distant Location (Provider Location): [facility]  Mode of Communication:  Telephone    Telephone Visit Start Time:  2:15  Telephone Visit End Time (telephone visit stop time): 2:44    How would patient like to obtain AVS? MyChart    SUBJECTIVE/OBJECTIVE:  Presents for education related to gestational diabetes.    Accompanied by: Self  Diabetes management related comments/concerns: mom is diabetic so mom knows and can help  Gestational weeks: 32w6d  Hospital planned for delivery: I dont know  Number of previous pregnancies: 1  Had any babies over 9 lbs: Yes  Previously had Gestational Diabetes: No  Have you ever had thyroid problems or taken thyroid medication?: No  Heart disease, mitral valve prolapse or rheumatic fever?: No  Hypertension : (!) Yes  High Cholesterol: No  High Triglycerides: No  Do you use tobacco products?: (!) Yes (smokes cigarettes)  Do you drink beer, wine or hard liquor?: No    Cultural Influences/Ethnic Background:  Not  or     Estimated Date of Delivery: May 18, 2024    1 hour OGTT  No results found for: \"GLU1\"      3 hour OGTT    Fasting  No results found for: \"GTTGF\"    1 hour  No results found for: \"GTTG1\"    2 hour  No results found for: \"GTTG2\"    3 hour  No results found for: \"GTTG3\"    Lifestyle and Health Behaviors:  Exercise:: Currently not exercising (active goes up and down stairs, moving around)  Meals include: Lunch, Dinner  Breakfast: wakes 7-9am  Lunch: 1st meal 11-2pm: chicken OR 1 taco from [employer]  Dinner: 8-9pm: meatloaf + pasta side OR pasta " 1-1+ cups  Snacks: doesnt snack  Beverages: Water, Soda (3-4 sodas per week.)    Healthy Coping:  Emotional response to diabetes: Ready to learn  Informal Support system:: Family  Stage of change: PREPARATION (Decided to change - considering how)    Current Management:       ASSESSMENT:  Gabby has a new GDM diagnosis. Reports she was diagnosed with prediabetes at the age of 7 but has not followed up on this. Her doctor ordered meter but she has not picked up yet. Plans to do this today. Her mother has diabetes so believes she will be a good support to her.     INTERVENTION:  Reviewed target blood glucose values, sharps disposal, diagnosis criteria for GDM and importance of good blood glucose management for health of mom and baby. Patient advised to call if 3 blood glucose elevated before returns next week. Instructed on ketone checking and told to call if unable to get ketones negative.       Discussed carbohydrate sources and impact on blood glucose. Reviewed basics of healthy eating and incorporating a variety of foods into meal plan. Instructed on carbohydrate counting and label reading and recommended patient consume 30-45g cho for breakfast, 45-60g cho for lunch and dinner and 15-30g cho for each snacks, also adding protein at each of these. Suggested plate method to balance meals.      Discussed importance of not going too low in carbohydrates since that may cause liver to produce excess glucose and contribute to elevated blood glucose readings and ketone formation. Encouraged eating breakfast within 1 hour of waking.  To also help prevent against ketone formation, protein was encouraged with meals and snacks, especially with the night snack. Reviewed benefits of exercising to help lower blood glucose and walking after meals, as tolerated and per MD approval, if seeing elevated blood glucose after a meal. Pt verbalized understanding of concepts discussed and recommendations provided.      Educational topics  covered today:  GDM diagnosis, pathophysiology, Risks and Complications of GDM, Means of controlling GDM, Using a Blood Glucose Monitor, Blood Glucose Goals, Logging and Interpreting Glucose Results, Ketone Testing, When to Call a Diabetes Educator or OB Provider, Healthy Eating During Pregnancy, Counting Carbohydrates, Meal Planning for GDM, and Physical Activity    Educational materials provided today:   Patrick Understanding Gestational Diabetes  GDM Log Book  Sharps Disposal  Care After Delivery    Pt verbalized understanding of concepts discussed and recommendations provided today.     PLAN:  1. Check glucose 4 times daily, before breakfast daily and 1 hour after each meal, or as recommended.  Blood glucose goal before breakfast: <95 mg/dL  1 hour after start of meals:  <140 mg/dL OR  2 hours after start of meal: <120mg/dL (can do this if you forget 1 hour check)    2. Check ketones daily or once a week after they have been negative for 7 days in a row. If ketones are elevated, let your diabetes educator know and continue to check daily until they are negative for 7 days in a row.    3. Continue with recommended physical activity.    4. Continue to follow recommended meal plan: 30-45g carbs at breakfast, 45-60g carbs at lunch, 45-60g carbs at supper, 15-30g carbs at snacks.  Follow consistent CHO meal plan, eat CHO and protein/fat at all meals/snacks.    5. Follow-up with OB doctor as recommended.    6. Call or MyChart message your diabetes educator if 3 or more blood sugars are above the goal in 1 week or if ketones are elevated (small or larger).     Jacqueline Martinez RD, AKASH, [facility]    Time Spent: 30 minutes  Encounter Type: Individual    Any diabetes medication dose changes were made via the CDE Protocol and Collaborative Practice Agreement with the patient's OB/GYN provider. A copy of this encounter was shared with the provider.

## 2024-03-29 NOTE — PROGRESS NOTES
"Diabetes Self-Management Education & Support  Type of Service: Telephone Visit    Originating Location (Patient Location): Home  Distant Location (Provider Location): [facility]  Mode of Communication:  Telephone    Telephone Visit Start Time:  2:15  Telephone Visit End Time (telephone visit stop time): 2:44    How would patient like to obtain AVS? [place]    SUBJECTIVE/OBJECTIVE:  Presents for education related to gestational diabetes.    Accompanied by: Self  Diabetes management related comments/concerns: mom is diabetic so mom knows and can help  Gestational weeks: 32w6d  Hospital planned for delivery: I dont know  Number of previous pregnancies: 1  Had any babies over 9 lbs: Yes  Previously had Gestational Diabetes: No  Have you ever had thyroid problems or taken thyroid medication?: No  Heart disease, mitral valve prolapse or rheumatic fever?: No  Hypertension : (!) Yes  High Cholesterol: No  High Triglycerides: No  Do you use tobacco products?: (!) Yes (smokes cigarettes)  Do you drink beer, wine or hard liquor?: No    Cultural Influences/Ethnic Background:  Not  or     Estimated Date of Delivery: May 18, 2024    1 hour OGTT  No results found for: \"GLU1\"      3 hour OGTT    Fasting  No results found for: \"GTTGF\"    1 hour  No results found for: \"GTTG1\"    2 hour  No results found for: \"GTTG2\"    3 hour  No results found for: \"GTTG3\"    Lifestyle and Health Behaviors:  Exercise:: Currently not exercising (active goes up and down stairs, moving around)  Meals include: Lunch, Dinner  Breakfast: wakes 7-9am  Lunch: 1st meal 11-2pm: chicken OR 1 taco from [employer]  Dinner: 8-9pm: meatloaf + pasta side OR pasta 1-1+ cups  Snacks: doesnt snack  Beverages: Water, Soda (3-4 sodas per week.)    Healthy Coping:  Emotional response to diabetes: Ready to learn  Informal Support system:: Family  Stage of change: PREPARATION (Decided to change - considering how)    Current " Management:       ASSESSMENT:  Gabby has a new GDM diagnosis. Reports she was diagnosed with prediabetes at the age of 7 but has not followed up on this. Her doctor ordered meter but she has not picked up yet. Plans to do this today. Her mother has diabetes so believes she will be a good support to her.     INTERVENTION:  Reviewed target blood glucose values, sharps disposal, diagnosis criteria for GDM and importance of good blood glucose management for health of mom and baby. Patient advised to call if 3 blood glucose elevated before returns next week. Instructed on ketone checking and told to call if unable to get ketones negative.       Discussed carbohydrate sources and impact on blood glucose. Reviewed basics of healthy eating and incorporating a variety of foods into meal plan. Instructed on carbohydrate counting and label reading and recommended patient consume 30-45g cho for breakfast, 45-60g cho for lunch and dinner and 15-30g cho for each snacks, also adding protein at each of these. Suggested plate method to balance meals.      Discussed importance of not going too low in carbohydrates since that may cause liver to produce excess glucose and contribute to elevated blood glucose readings and ketone formation. Encouraged eating breakfast within 1 hour of waking.  To also help prevent against ketone formation, protein was encouraged with meals and snacks, especially with the night snack. Reviewed benefits of exercising to help lower blood glucose and walking after meals, as tolerated and per MD approval, if seeing elevated blood glucose after a meal. Pt verbalized understanding of concepts discussed and recommendations provided.      Educational topics covered today:  GDM diagnosis, pathophysiology, Risks and Complications of GDM, Means of controlling GDM, Using a Blood Glucose Monitor, Blood Glucose Goals, Logging and Interpreting Glucose Results, Ketone Testing, When to Call a Diabetes Educator or OB  Provider, Healthy Eating During Pregnancy, Counting Carbohydrates, Meal Planning for GDM, and Physical Activity    Educational materials provided today:   Patrick Understanding Gestational Diabetes  GDM Log Book  Sharps Disposal  Care After Delivery    Pt verbalized understanding of concepts discussed and recommendations provided today.     PLAN:  1. Check glucose 4 times daily, before breakfast daily and 1 hour after each meal, or as recommended.  Blood glucose goal before breakfast: <95 mg/dL  1 hour after start of meals:  <140 mg/dL OR  2 hours after start of meal: <120mg/dL (can do this if you forget 1 hour check)    2. Check ketones daily or once a week after they have been negative for 7 days in a row. If ketones are elevated, let your diabetes educator know and continue to check daily until they are negative for 7 days in a row.    3. Continue with recommended physical activity.    4. Continue to follow recommended meal plan: 30-45g carbs at breakfast, 45-60g carbs at lunch, 45-60g carbs at supper, 15-30g carbs at snacks.  Follow consistent CHO meal plan, eat CHO and protein/fat at all meals/snacks.    5. Follow-up with OB doctor as recommended.    6. Call or MyChart message your diabetes educator if 3 or more blood sugars are above the goal in 1 week or if ketones are elevated (small or larger).     Jacqueline Martinez RD, AKASH, [facility]    Time Spent: 30 minutes  Encounter Type: Individual    Any diabetes medication dose changes were made via the CDE Protocol and Collaborative Practice Agreement with the patient's OB/GYN provider. A copy of this encounter was shared with the provider.

## 2024-03-29 NOTE — PATIENT INSTRUCTIONS
"Your 1 week follow-up Diabetes Education visit is scheduled on 4/4    1. Check glucose 4 times daily, before breakfast daily and 1 hour after each meal, or as recommended.  Blood glucose goal before breakfast: <95 mg/dL  1 hour after start of meals:  <140 mg/dL OR  2 hours after start of meal: <120mg/dL (can do this if you forget 1 hour check)     -Lancets should be placed in a sharps container or you can use a laundry container, do not throw lancets in the trash.  If using laundry container, once mostly full, can duct-tape the lid closed, label \"Sharps-do not recycle\" and then place in trash. You can call your [employer] service to find appropriate drop off sites for lancets.    2. Check ketones daily or once a week after they have been negative for 7 days in a row. If ketones are elevated, let your diabetes educator know and continue to check daily until they are negative for 7 days in a row.    3. Continue with recommended physical activity.    4. Continue to follow recommended meal plan: 30-45g carbs at breakfast, 45-60g carbs at lunch, 45-60g carbs at supper, 15-30g carbs at snacks.  Follow consistent CHO meal plan, eat CHO and protein/fat at all meals/snacks.    5. Follow-up with OB doctor as recommended.    6. Call or MyChart message your diabetes educator if 3 or more blood sugars are above the goal in 1 week or if ketones are elevated (small or larger).       [facility] for the [facility]:  For Your Diabetes Education or Nutrition Appointments Call:  [phone]   For Diabetes Education and Nutrition Related Questions:   Phone: [phone]  Send MyChart Message   If you need a medication refill please contact your pharmacy. Please allow 3 business days for your refills to be completed.     "

## 2024-04-02 ENCOUNTER — APPOINTMENT (OUTPATIENT)
Dept: MATERNAL FETAL MEDICINE | Facility: CLINIC | Age: 29
End: 2024-04-02
Attending: OBSTETRICS & GYNECOLOGY
Payer: COMMERCIAL

## 2024-04-02 ENCOUNTER — OFFICE VISIT (OUTPATIENT)
Dept: MATERNAL FETAL MEDICINE | Facility: CLINIC | Age: 29
End: 2024-04-02
Attending: ADVANCED PRACTICE MIDWIFE
Payer: COMMERCIAL

## 2024-04-02 ENCOUNTER — HOSPITAL ENCOUNTER (OUTPATIENT)
Dept: ULTRASOUND IMAGING | Facility: CLINIC | Age: 29
Discharge: HOME | End: 2024-04-02
Attending: OBSTETRICS & GYNECOLOGY
Payer: COMMERCIAL

## 2024-04-02 DIAGNOSIS — R03.0 ELEVATED BP WITHOUT DIAGNOSIS OF HYPERTENSION: ICD-10-CM

## 2024-04-02 DIAGNOSIS — O24.410 DIET CONTROLLED GESTATIONAL DIABETES MELLITUS (GDM) IN THIRD TRIMESTER: ICD-10-CM

## 2024-04-02 DIAGNOSIS — O99.210 OBESITY IN PREGNANCY: ICD-10-CM

## 2024-04-02 DIAGNOSIS — O99.210 OBESITY IN PREGNANCY: Primary | ICD-10-CM

## 2024-04-02 PROCEDURE — 76819 FETAL BIOPHYS PROFIL W/O NST: CPT

## 2024-04-02 PROCEDURE — 76819 FETAL BIOPHYS PROFIL W/O NST: CPT | Mod: 26 | Performed by: OBSTETRICS & GYNECOLOGY

## 2024-04-02 NOTE — PROGRESS NOTES
The patient was seen for an ultrasound in the Maternal-Fetal Medicine Center at the [facility] today.  For a detailed report of the ultrasound examination, please see the ultrasound report which can be found under the imaging tab.    If you have questions regarding today's evaluation or if we can be of further service, please contact the Maternal-Fetal Medicine Center.    Ina Pressley MD  , OB/GYN  Maternal-Fetal Medicine  [phone] (Pager)

## 2024-04-09 ENCOUNTER — OFFICE VISIT (OUTPATIENT)
Dept: MATERNAL FETAL MEDICINE | Facility: CLINIC | Age: 29
End: 2024-04-09
Attending: OBSTETRICS & GYNECOLOGY
Payer: COMMERCIAL

## 2024-04-09 ENCOUNTER — HOSPITAL ENCOUNTER (OUTPATIENT)
Dept: ULTRASOUND IMAGING | Facility: CLINIC | Age: 29
Discharge: HOME | End: 2024-04-09
Attending: OBSTETRICS & GYNECOLOGY
Payer: COMMERCIAL

## 2024-04-09 ENCOUNTER — PRE VISIT (OUTPATIENT)
Dept: SURGERY | Facility: CLINIC | Age: 29
End: 2024-04-09

## 2024-04-09 ENCOUNTER — OFFICE VISIT (OUTPATIENT)
Dept: MATERNAL FETAL MEDICINE | Facility: CLINIC | Age: 29
End: 2024-04-09
Attending: ADVANCED PRACTICE MIDWIFE
Payer: COMMERCIAL

## 2024-04-09 VITALS
RESPIRATION RATE: 20 BRPM | SYSTOLIC BLOOD PRESSURE: 124 MMHG | HEART RATE: 97 BPM | DIASTOLIC BLOOD PRESSURE: 73 MMHG | BODY MASS INDEX: 62.39 KG/M2 | OXYGEN SATURATION: 97 % | WEIGHT: 293 LBS

## 2024-04-09 DIAGNOSIS — R03.0 ELEVATED BP WITHOUT DIAGNOSIS OF HYPERTENSION: ICD-10-CM

## 2024-04-09 DIAGNOSIS — O99.210 OBESITY IN PREGNANCY: ICD-10-CM

## 2024-04-09 DIAGNOSIS — O99.210 OBESITY AFFECTING PREGNANCY, ANTEPARTUM, UNSPECIFIED OBESITY TYPE: Primary | ICD-10-CM

## 2024-04-09 DIAGNOSIS — O09.93 SUPERVISION OF HIGH RISK PREGNANCY IN THIRD TRIMESTER: Primary | ICD-10-CM

## 2024-04-09 DIAGNOSIS — O24.410 DIET CONTROLLED GESTATIONAL DIABETES MELLITUS (GDM) IN THIRD TRIMESTER: ICD-10-CM

## 2024-04-09 PROCEDURE — 76819 FETAL BIOPHYS PROFIL W/O NST: CPT | Mod: 26 | Performed by: OBSTETRICS & GYNECOLOGY

## 2024-04-09 PROCEDURE — 76819 FETAL BIOPHYS PROFIL W/O NST: CPT

## 2024-04-09 PROCEDURE — G0463 HOSPITAL OUTPT CLINIC VISIT: HCPCS | Mod: 25 | Performed by: ADVANCED PRACTICE MIDWIFE

## 2024-04-09 PROCEDURE — 99213 OFFICE O/P EST LOW 20 MIN: CPT | Mod: 25 | Performed by: ADVANCED PRACTICE MIDWIFE

## 2024-04-09 ASSESSMENT — PAIN SCALES - GENERAL: PAINLEVEL: NO PAIN (0)

## 2024-04-09 NOTE — PROGRESS NOTES
Please see full imaging report from [employer] program under imaging tab.    Justyn Peters MD  Maternal Fetal Medicine

## 2024-04-09 NOTE — PROGRESS NOTES
"Maternal fetal Medicine OB Follow up visit.     Gabby Phipps  : 1995  MRN: [id number]    CC: OB Follow-up    Subjective:  Gabby Phipps is a 28 year old  at 34w3d presenting for routine OB follow-up. Today, she is feeling well. Offers no concerns at this time. She does not have a glucose log to review, but reports that her fasting levels are between 80-90, cannot recall her 1-hour postprandial values. Unfortunately did not attend her diabetic ed visit on .    Patient denies regular, painful contractions, denies loss of fluid or vaginal bleeding. Reports fetal movement.      Patient also denies any recent fevers/chills, headaches or changes in vision, RUQ pain, nausea or vomiting, constipation, diarrhea or other systemic symptoms.      OB Hx:  OB History    Para Term  AB Living   2 1 1 0 0 1   SAB IAB Ectopic Multiple Live Births   0 0 0 0 1      # Outcome Date GA Lbr Elvin/2nd Weight Sex Delivery Anes PTL Lv   2 Current            1 Term 20 39w3d 13:19 / 02:30 4.14 kg (9 lb 2 oz) M CS-LTranv Gen, EPI  GABBI      Name: BETTYE BERRIOS      Apgar1: 5  Apgar5: 6         Objective:  VS: see flowsheet  Gen: alert, oriented, NAD  Skin: warm, dry, intact  Respiratory: breathing unlabored, no SOB  Abdominal: gravid, non-tender  Pelvic: deferred  Extremities: WNL  Psych: mood WNL, behavior WNL      OB Ultrasound:  Please see \"imaging\" tab under chart review for today's ultrasound results.      Assessment/Plan:  28 year old  at 34w3d here for follow up OB visit.    Pregnancy has been complicated by:   - Elevated BMI  - Remote hx cHTN               - mild-rang BP x1 at 31wks  - Asthma  - Current smoker  - THC use   - Pre-diabetes  - GDM  - History of tracheostomy               Elevated BMI:  Remote hx of cHTN (normotensive since weight loss):  Mild-range BP x1 in pregnancy:  - Dx with cHTN when at a higher weight (500lbs). BP has become normotensive with previous weight loss. "   - Baseline preE Labs: WNL. No UPC on file.  - Low dose ASA was previously recommended, however patient not taking.  - Previously recommended for RODRIGO evaluation.  - ECG WNL for pregnancy.   - Mild-range BP on 3/22 at pulmonology office. Asymptomatic of s/s of preE at this time and updated labs with UPC are WNL on 3/27.      Smoking and THC use: (previously addressed at past visit)  - Currently smokes half PPD.  - THC use most days.  - Encouraged cessation.   - Reviewed that a drug screening for patient will be recommended on admission to the hospital, and that her baby will be screened as well. Social work will likely plan to see patient while hospitalized as well.       GDMA1:  - Elevated A1c in 8/2023 of 6.1   - GTT at outside clinic results obtained and elevated: 91, 189, 179, 86.  - Diabetic ed referral placed. Unfortunately, missed most recent visit.  - Testing supplies sent to pharmacy and reviewed how to begin testing blood sugar levels 4x per day. Written parameters sent via AVS.   - Reviewed the importance of checking glucose levels 4x per day for remainder of pregnancy as this can help us determine if glucose is well-controlled by diet and exercise alone or if insulin were to be needed.   - Encouraged Gabby to bring glucose log to next clinic visit to review values.      Asthma:  History of tracheostomy (2009): (previously addressed at past visit)  - Asthma well-controlled and not requiring frequent inhaler use. Albuterol PRN.  - Anesthesia consult on 3/11 in agreement with evaluation by ENT and with plans for airway assessment day of surgery.   - ENT referral previously placed. Unfortunately, there are no openings available until September. Will reach out to her local clinic to see if they can place an ENT referral that may have better availability for assessment prior to delivery.     Routine PNC:  - Prenatal labs:  Rh: +  antibody: negative               HepB/HIV/RPR/HepC: nonreactive                "GC/CT: negative               Rubella: equivocal - recommend MMR vaccine postpartum                 GCT: failed  GTT: failed  - Pap smear: \"negative\" (23) - per outside records, but unable to see official lab result.  - Immunizations:  s/p TDap and Flu  - GBS in urine at NOB      Surveillance:  - Serial growth US.  - Weekly BPPs starting at 32 weeks now in the setting of GDM. Discussed twice weekly recommendation if insulin is required as pregnancy advances.      Delivery planning:  - Given hx of cHTN without medications, will recommend delivery at 38w0d. Sooner if clinically indicated.  - c/s scheduled for . Planning pre-op visit with Dr. Farmer on  given hx of tracheostomy and BMI.  - Feeding: needs to be discussed  - Contraception plans: federal tubal papers signed and scanned into chart. Unsure if she desires sterilization, but will have a decision by the time of delivery.      RTC in 1 week    20 minutes spent on the date of the encounter, doing chart review, history and exam, documentation and further activities as noted.      Melly Morgan CNM on 2024 at 10:54 AM      "

## 2024-04-09 NOTE — NURSING NOTE
Gabby seen in clinic today for F/U OB visit and BPP at 34w3d gestation. VS WNL. Pt reports + fetal movement, see flowsheet. Pt evaluated for  labor, preeclampsia, vaginal bleeding, infection, fetal wellbeing  without concerns. Pt denies bleeding/lof/change in vaginal discharge/contractions/headache/vision changes/chest pain/SOB/edema. Pt notified to review new pt education in AVS, verbally reviewed highlights. KRISTINE Morgan met with pt and discussed POC. Plan for pt to check BS. Pt reports she is checking BS at home, but she forgot the numbers at home.   Pt will keep weekly apts and left amb and stable.  Future visits scheduled at . Pt discharged stable and ambulatory.

## 2024-04-09 NOTE — PATIENT INSTRUCTIONS
Weeks 34 to 36 of Your Pregnancy: Care Instructions  Your belly has grown quite large. It's almost time to give birth! Your baby's lungs are almost ready to breathe air. The skull bones are firm enough to protect your baby's head. But they're soft enough to move down through the birth canal.    You might be wondering what to expect during labor. Because each birth is different, there's no way to know exactly what childbirth will be like for you. Talk to your doctor or midwife about any concerns you have.    You'll probably have a test for group B streptococcus (GBS). GBS is bacteria that can live in the vagina and rectum. GBS can make your baby sick after birth. If you test positive, you'll get antibiotics during labor.    Choose what type of pain relief you would like during labor.  You can choose from a few types, including medicine and non-medicine options. You may want to use several types of pain relief.     Know how medicines can help with pain during labor.  Some medicines lower anxiety and help with some of the pain. Others make your lower body numb so that you will feel less pain.     Tell your doctor about your pain medicine choice.  Do this before you start labor or very early in your labor. You may be able to change your mind during labor.     Learn about the stages of labor.    The first stage includes the early (latent) and active phases of labor. Contractions start in early labor. During active labor, contractions get stronger, last longer, and happen more often. And the cervix opens more rapidly.  The second stage starts when you're ready to push. During this stage, your baby is born.  During the third stage, you'll usually have a few more contractions to push out the placenta.   Follow-up care is a key part of your treatment and safety. Be sure to make and go to all appointments, and call your doctor if you are having problems. It's also a good idea to know your test results and keep a list of the  "medicines you take.  Where can you learn more?  Go to https://www.[employer].net/patiented  Enter B912 in the search box to learn more about \"Weeks 34 to 36 of Your Pregnancy: Care Instructions.\"  Current as of: July 10, 2023               Content Version: 14.0    1385-1492 [employer]wise, [employer].   Care instructions adapted under license by your healthcare professional. If you have questions about a medical condition or this instruction, always ask your healthcare professional. Healthwise, [employer] disclaims any warranty or liability for your use of this information.      "

## 2024-04-10 ENCOUNTER — TELEPHONE (OUTPATIENT)
Dept: EDUCATION SERVICES | Facility: CLINIC | Age: 29
End: 2024-04-10
Payer: COMMERCIAL

## 2024-04-10 NOTE — TELEPHONE ENCOUNTER
Called Gabby again as she missed her last GDM appointment.    Voicemail left requesting return; MyChart message or phone call to diabetes educators.  Requesting current blood sugars, ketones and list of any foods consumed prior to any elevated blood sugars.     Last date of Gabby Phipps's communication was: 3/29.    This our 2nd telephone attempt and also sent [employer].     We will not make any further attempts to reach patient.     Will send to referral provider and provider she is seeing next so they can remind her to schedule with us.

## 2024-04-10 NOTE — Clinical Note
Gabby pretty showed Gdm appt, have tried reaching out without success. Can you please remind her at next appt that she needs to make appt with us. Please suggest she send in her BG numbers via [employer] for us to review in the meantime. We will not make further attempts to reach her.  Thanks! Jacqueline Martinez RD, LD, [facility]

## 2024-04-16 ENCOUNTER — HOSPITAL ENCOUNTER (OUTPATIENT)
Dept: ULTRASOUND IMAGING | Facility: CLINIC | Age: 29
Discharge: HOME | End: 2024-04-16
Attending: OBSTETRICS & GYNECOLOGY
Payer: COMMERCIAL

## 2024-04-16 ENCOUNTER — OFFICE VISIT (OUTPATIENT)
Dept: MATERNAL FETAL MEDICINE | Facility: CLINIC | Age: 29
End: 2024-04-16
Attending: ADVANCED PRACTICE MIDWIFE
Payer: COMMERCIAL

## 2024-04-16 ENCOUNTER — OFFICE VISIT (OUTPATIENT)
Dept: MATERNAL FETAL MEDICINE | Facility: CLINIC | Age: 29
End: 2024-04-16
Attending: OBSTETRICS & GYNECOLOGY
Payer: COMMERCIAL

## 2024-04-16 VITALS
HEART RATE: 98 BPM | SYSTOLIC BLOOD PRESSURE: 120 MMHG | WEIGHT: 293 LBS | OXYGEN SATURATION: 97 % | DIASTOLIC BLOOD PRESSURE: 86 MMHG | BODY MASS INDEX: 62.19 KG/M2 | RESPIRATION RATE: 22 BRPM

## 2024-04-16 DIAGNOSIS — E66.01 EXTREME OBESITY WITH RESPIRATORY DISORDER (H): ICD-10-CM

## 2024-04-16 DIAGNOSIS — J98.9 EXTREME OBESITY WITH RESPIRATORY DISORDER (H): ICD-10-CM

## 2024-04-16 DIAGNOSIS — O99.210 OBESITY AFFECTING PREGNANCY, ANTEPARTUM, UNSPECIFIED OBESITY TYPE: Primary | ICD-10-CM

## 2024-04-16 DIAGNOSIS — O99.210 OBESITY IN PREGNANCY: ICD-10-CM

## 2024-04-16 DIAGNOSIS — O24.419 GESTATIONAL DIABETES MELLITUS (GDM) IN THIRD TRIMESTER, GESTATIONAL DIABETES METHOD OF CONTROL UNSPECIFIED: ICD-10-CM

## 2024-04-16 DIAGNOSIS — R03.0 ELEVATED BP WITHOUT DIAGNOSIS OF HYPERTENSION: ICD-10-CM

## 2024-04-16 PROCEDURE — G0463 HOSPITAL OUTPT CLINIC VISIT: HCPCS | Mod: 25 | Performed by: ADVANCED PRACTICE MIDWIFE

## 2024-04-16 PROCEDURE — 99214 OFFICE O/P EST MOD 30 MIN: CPT | Mod: 25 | Performed by: ADVANCED PRACTICE MIDWIFE

## 2024-04-16 PROCEDURE — 76819 FETAL BIOPHYS PROFIL W/O NST: CPT

## 2024-04-16 PROCEDURE — 76819 FETAL BIOPHYS PROFIL W/O NST: CPT | Mod: 26 | Performed by: OBSTETRICS & GYNECOLOGY

## 2024-04-16 ASSESSMENT — PAIN SCALES - GENERAL: PAINLEVEL: NO PAIN (0)

## 2024-04-16 NOTE — PROGRESS NOTES
"Maternal fetal Medicine OB Follow up visit.     Gabby Phipps  : 1995  MRN: [id number]    CC: OB Follow-up    Subjective:  Gabby Phipps is a 28 year old  at 35w3d presenting for routine OB follow-up. Today, she is feeling well. Offers no OB concerns at this time. She is currently in the process of moving and shares that she has not been checking her blood sugar so she has no values to review. She shares that she is moving in with her mom and grandma as her  recently was sent back to [facility]. She denies regular, painful contractions, denies loss of fluid or vaginal bleeding. Reports fetal movement.          OB Hx:  OB History    Para Term  AB Living   2 1 1 0 0 1   SAB IAB Ectopic Multiple Live Births   0 0 0 0 1      # Outcome Date GA Lbr Elvin/2nd Weight Sex Type Anes PTL Lv   2 Current            1 Term 20 39w3d 13:19 / 02:30 4.14 kg (9 lb 2 oz) M CS-LTranv Gen, EPI  GABBI      Name: BETTYE BERRIOS      Apgar1: 5  Apgar5: 6         Objective:  /86 (BP Location: Left arm, Patient Position: Sitting, Cuff Size: Adult Large)   Pulse 98   Resp 22   Wt (!) 182.8 kg (403 lb)   LMP 2023   SpO2 97%   BMI 62.19 kg/m      Gen: alert, oriented, NAD  Skin: warm, dry, intact  Respiratory: breathing unlabored, no SOB  Abdominal: gravid, non-tender  Pelvic: deferred  Extremities: WNL  Psych: mood WNL, behavior WNL      OB Ultrasound:  Please see \"imaging\" tab under chart review for today's ultrasound results.    Chest x-ray:  Narrative & Impression   Chest one view portable     HISTORY: Antepartum dyspnea     COMPARISON STUDY: None.     FINDINGS: Pulmonary vascular is engorged. Cardiac silhouette is  nonenlarged. Lungs otherwise clear.                                                                      IMPRESSION: Pulmonary venous congestion.     Most Recent [facility] Pulmonary Function Testing    FVC-Pred   Date Value Ref Range Status   2024 3.89 L  " "    FVC-Pre   Date Value Ref Range Status   2024 2.65 L      FVC-%Pred-Pre   Date Value Ref Range Status   2024 68 %      FEV1-Pre   Date Value Ref Range Status   2024 2.27 L      FEV1-%Pred-Pre   Date Value Ref Range Status   2024 68 %      FEV1FVC-Pred   Date Value Ref Range Status   2024 86 %      FEV1FVC-Pre   Date Value Ref Range Status   2024 86 %      No results found for: \"\"  FEFMax-Pred   Date Value Ref Range Status   2024 7.45 L/sec      FEFMax-Pre   Date Value Ref Range Status   2024 5.91 L/sec      FEFMax-%Pred-Pre   Date Value Ref Range Status   2024 79 %      ExpTime-Pre   Date Value Ref Range Status   2024 3.71 sec      FIFMax-Pre   Date Value Ref Range Status   2024 3.55 L/sec      FEV1FEV6-Pred   Date Value Ref Range Status   2024 86 %      FEV1FEV6-Pre   Date Value Ref Range Status   2024 86 %      No results found for: \"\"          Assessment/Plan:  28 year old  at 35w3d here for follow up OB visit.    Pregnancy has been complicated by:    Elevated BMI  - Remote hx cHTN (normotensive in this pregnancy)               - mild-rang BP x1 at 31wks  - Asthma?  - Current smoker  - THC use   - Pre-diabetes  - GDM  - History of tracheostomy               Elevated BMI:  Remote hx of cHTN (normotensive since weight loss):  Mild-range BP x1 in pregnancy:  - Dx with cHTN when at a higher weight (500lbs). BP has become normotensive with previous weight loss.   - Baseline preE Labs: WNL. No UPC on file.  - Low dose ASA was previously recommended, however patient not taking.  - Previously recommended for RODRIGO evaluation.  - ECG WNL for pregnancy.   - Mild-range BP on 3/22 at pulmonology office. Asymptomatic of s/s of preE at this time and updated labs with [facility] are WNL on 3/27.      Smoking and THC use: (previously addressed at past visit)  - Currently smokes half PPD.  - THC use most days.  - Encouraged cessation.   - " "Reviewed that a drug screening for patient will be recommended on admission to the hospital, and that her baby will be screened as well. Social work will likely plan to see patient while hospitalized as well.       GDM of unknown control:  - Majority of visit today was spent discussing that since no glucose values have been able to be reviewed, her GDM is of unknown control. Discussed that I would recommend twice weekly BPPs if she does not plan to start checking glucose values, however she declines this given her distance away, and not interested in returning to her local OB provider for the second BPP. We discussed that given her unknown control, it could be recommended to be delivered sooner than currently scheduled (38wk) pending her ongoing  surveillance. Gabby states she will do her best to begin checking glucose levels and is agreeable to rescheduling her visits with diabetic ed.   - Elevated A1c in 2023 of 6.1   - GTT at outside clinic results obtained and elevated: 91, 189, 179, 86.  - Encouraged Gabby to bring glucose log to next clinic visit to review values.      Asthma (uncertain dx):  History of tracheostomy (): (previously addressed at past visit)  - Pulmonology visit on 3/22. Per documentation: \"Her asthma at this point is unconfirmed and her spirometry shows no obstruction. In regards to DLCO reduction, we obtained a single-view CXR today with abdominal shielding to r/o extensive fibrosis from H1N1, I reviewed the CXR image and it only shows pulmonary venous congestion which is likely from pregnancy/increased fluid retention. We will obtain HRCT chest r/o ILD in 6 months and repeat PFTs at that time to see if we can rule in asthma at that point. LABA/ICS at this juncture are not indicated as she very rarely needs albuterol. For planned , patient is at intermediate risk for intermediate risk procedure from pulmonary standpoint. If intubated, would recommend extubation to " "BiPAP as she is very high risk for obesity hypoventilation syndrome and CO2 retention. In-patient pulmonary consult is available in [facility] and we would be happy to be of assistance if needed.\"  - Anesthesia consult on 3/11 in agreement with evaluation by ENT and with plans for airway assessment day of surgery.   - ENT referral previously placed. Unfortunately, there are no openings available until September. Will reach out to her local clinic to see if they can place an ENT referral that may have better availability for assessment prior to delivery.     Routine PNC:  - Prenatal labs:  Rh: +  antibody: negative               HepB/HIV/RPR/HepC: nonreactive               GC/CT: negative               Rubella: equivocal - recommend MMR vaccine postpartum                 GCT: failed  GTT: failed  - Pap smear: \"negative\" (23) - per outside records, but unable to see official lab result.  - Immunizations:  s/p TDap and Flu  - GBS in urine at NOB      Surveillance:  - Serial growth US.  - Weekly BPPs starting at 32 weeks now in the setting of GDM. Discussed twice weekly recommendation if insulin is required as pregnancy advances.      Delivery planning:  - Given hx of cHTN without medications, will recommend delivery at 38w0d. Sooner if clinically indicated.  - c/s scheduled for . Planning pre-op visit with Dr. Farmer on  given hx of tracheostomy and BMI.  - Feeding: needs to be discussed  - Contraception plans: federal tubal papers signed and scanned into chart. Unsure if she desires sterilization, but will have a decision by the time of delivery.      RTC in 1 week    30 minutes spent on the date of the encounter, doing chart review, history and exam, documentation and further activities as noted.      Melly Morgan CNM on 2024 at 2:33 PM      "

## 2024-04-16 NOTE — NURSING NOTE
Gabby seen in clinic today for F/U OB visit at 35w3d gestation. VS WNL. Pt reports + fetal movement, see flowsheet. Pt evaluated for  labor, preeclampsia, vaginal bleeding, infection, fetal wellbeing without concerns. Pt denies bleeding/lof/change in vaginal discharge/contractions/headache/vision changes/chest pain/SOB/edema. Pt reports she is moving and hasn't been checking BS. Pt notified to review new pt education in AVS, verbally reviewed highlights. KRISTINE Morgan met with pt and discussed POC. Plan for pt to come back weekly for OBV and will do growth U/S next week. If AC or growth large then pt to be delivered earlier.. Future visits scheduled at . Pt discharged stable and ambulatory.

## 2024-04-16 NOTE — PATIENT INSTRUCTIONS
Weeks 34 to 36 of Your Pregnancy: Care Instructions  Your belly has grown quite large. It's almost time to give birth! Your baby's lungs are almost ready to breathe air. The skull bones are firm enough to protect your baby's head. But they're soft enough to move down through the birth canal.    You might be wondering what to expect during labor. Because each birth is different, there's no way to know exactly what childbirth will be like for you. Talk to your doctor or midwife about any concerns you have.    You'll probably have a test for group B streptococcus (GBS). GBS is bacteria that can live in the vagina and rectum. GBS can make your baby sick after birth. If you test positive, you'll get antibiotics during labor.    Choose what type of pain relief you would like during labor.  You can choose from a few types, including medicine and non-medicine options. You may want to use several types of pain relief.     Know how medicines can help with pain during labor.  Some medicines lower anxiety and help with some of the pain. Others make your lower body numb so that you will feel less pain.     Tell your doctor about your pain medicine choice.  Do this before you start labor or very early in your labor. You may be able to change your mind during labor.     Learn about the stages of labor.    The first stage includes the early (latent) and active phases of labor. Contractions start in early labor. During active labor, contractions get stronger, last longer, and happen more often. And the cervix opens more rapidly.  The second stage starts when you're ready to push. During this stage, your baby is born.  During the third stage, you'll usually have a few more contractions to push out the placenta.   Follow-up care is a key part of your treatment and safety. Be sure to make and go to all appointments, and call your doctor if you are having problems. It's also a good idea to know your test results and keep a list of the  "medicines you take.  Where can you learn more?  Go to https://www.[employer].net/patiented  Enter B912 in the search box to learn more about \"Weeks 34 to 36 of Your Pregnancy: Care Instructions.\"  Current as of: July 10, 2023               Content Version: 14.0    [id number] Healthwise, [employer].   Care instructions adapted under license by your healthcare professional. If you have questions about a medical condition or this instruction, always ask your healthcare professional. [employer], [employer] disclaims any warranty or liability for your use of this information.      "

## 2024-04-16 NOTE — PROGRESS NOTES
Please refer to ultrasound report under 'Imaging' Studies of 'Chart Review' tabs.    Chip Cruz M.D.

## 2024-04-17 ENCOUNTER — TELEPHONE (OUTPATIENT)
Dept: EDUCATION SERVICES | Facility: CLINIC | Age: 29
End: 2024-04-17
Payer: COMMERCIAL

## 2024-04-17 NOTE — TELEPHONE ENCOUNTER
Received message from ARMIDA. Gabby is agreeable to scheduling a second GDM appt, she previously no showed appt. At appt yesterday Gabby reported she was not checking her BG, SAMRA encouraged her to at least check 2x per day.     I called Gabby and left  with triage line number to call and schedule so she can get in sooner.    Will also send her Mychart message    Jacqueline Martinez RD, AKASH, [facility]

## 2024-04-17 NOTE — PROGRESS NOTES
"    Maternal-Fetal Medicine Return OB Visit     Gabby Phipps  : 1995  MRN: [id number]    Subjective     Feeling well today. Denies contractions, leaking of fluid, vaginal bleeding, or decreased fetal movement. She has also been unable to obtain a glucometer. She thus has not been checking her blood sugars. She feels like this baby is smaller than her last.     Objective   /79 (BP Location: Left arm, Patient Position: Sitting, Cuff Size: Adult Large)   Pulse 97   Resp 20   Wt (!) 184.7 kg (407 lb 3.2 oz)   LMP 2023   SpO2 96%   BMI 62.84 kg/m    Gen: NAD  CV: Warm, well perfused  Resp: Breathing comfortably on room air  Abd: Gravid, non-tender  Ext: No edema    Ultrasound   See today's ultrasound report under the \"imaging\" tab.    Labs     Blood glucose and hemoglobin A1c drawn and pending     Assessment/Plan     Gabby Phipps is a 28 year old  at 36w3d by 11w0d US here for follow-up OB visit.    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 Farmer) after multidisciplinary meeting.    Elevated BMI  Remote hx of cHTN (normotensive since weight loss)  Mild-range BP x1 in pregnancy  - Dx with cHTN when at a higher weight (500lbs).   - BP has become normotensive with previous weight loss.   - Baseline preE " "Labs: WNL  - Low dose ASA was previously recommended, however patient not taking.  - Previously recommended for RODRIGO evaluation.  - ECG WNL for pregnancy.   - Mild-range BP on 3/22 at pulmonology office, has been normotensive since with normal labs     Smoking and THC use (not addressed today)  - Currently smokes half PPD  - THC use most days     Asthma (uncertain dx)  History of tracheostomy ()  - Pulmonology visit on 3/22: For planned , patient is at intermediate risk for intermediate risk procedure from pulmonary standpoint. If intubated, would recommend extubation to BiPAP as she is very high risk for obesity hypoventilation syndrome and CO2 retention. In-patient pulmonary consult is available in [facility] and we would be happy to be of assistance if needed.  - ENT referral previously placed, but has not been able to see, thus Dr. Nolan was able to facilitate discussion and chart review with Dr. Teodora Vines who recommends use of video laryngoscope rather than just a Palacio blade, but there was no stenosis in her last delivery with a 7.0 ETT tube and need for general, as well as a lower weight now than she was in her last delivery.  - Anesthesia consult on 3/11 in agreement with evaluation by ENT and with plans for airway assessment day of surgery.      Routine PNC:  - Prenatal labs:  Rh: +  antibody: negative               HepB/HIV/RPR/HepC: nonreactive               GC/CT: negative               Rubella: equivocal - recommend MMR vaccine postpartum                 GCT: failed  GTT: failed  - Pap smear: \"negative\" (23) - per outside records, but unable to see official lab result.  - Immunizations:  s/p TDap and Flu  - GBS in urine at [facility]     Delivery planning:  - Given multiple risk factors with unknown GDM control, inability to check blood sugars, distance from the hospital, elevated BMI with airway risk, will move up delivery to 37 weeks after multidisciplinary discussion with MFM and OB " teams  - Contraception plans: federal tubal papers signed and scanned into chart. Unsure if she desires sterilization, but will have a decision by the time of delivery.   -  scheduled for 3/29 at 1230 (messaged Dr. Farmer updated date and time)    The patient was seen and evaluated with Dr. Ina Pressley.    Katy Velasco MD  Maternal Fetal Medicine Fellow       [facility] Attending Attestation  I have seen and evaluated the patient with Dr. Velasco. I reviewed her chart and agree with the above documented assessment and plan. I spent a total of 25 minutes on the date of this encounter including preparing to see the patient (reviewing medical records/tests), counseling and discussing the plan of care, documenting the visit in the electronic medical record, and communicating with other health care professionals and/or care coordination.Please see her note for specific details; I have made the necessary edits/additions.  The patient was also seen for an ultrasound in the Maternal-Fetal Medicine Center at the [facility] today.  For a detailed report of the ultrasound examination, please see the ultrasound report which can be found under the imaging tab.  Ina Pressley MD  Maternal Fetal Medicine Physician

## 2024-04-23 ENCOUNTER — APPOINTMENT (OUTPATIENT)
Dept: LAB | Facility: CLINIC | Age: 29
End: 2024-04-23
Attending: OBSTETRICS & GYNECOLOGY
Payer: COMMERCIAL

## 2024-04-23 ENCOUNTER — OFFICE VISIT (OUTPATIENT)
Dept: MATERNAL FETAL MEDICINE | Facility: CLINIC | Age: 29
End: 2024-04-23
Attending: ADVANCED PRACTICE MIDWIFE
Payer: COMMERCIAL

## 2024-04-23 ENCOUNTER — VIRTUAL VISIT (OUTPATIENT)
Dept: OBGYN | Facility: CLINIC | Age: 29
End: 2024-04-23
Attending: OBSTETRICS & GYNECOLOGY
Payer: COMMERCIAL

## 2024-04-23 ENCOUNTER — HOSPITAL ENCOUNTER (OUTPATIENT)
Dept: ULTRASOUND IMAGING | Facility: CLINIC | Age: 29
Discharge: HOME | End: 2024-04-23
Attending: OBSTETRICS & GYNECOLOGY
Payer: COMMERCIAL

## 2024-04-23 VITALS
SYSTOLIC BLOOD PRESSURE: 119 MMHG | BODY MASS INDEX: 62.84 KG/M2 | RESPIRATION RATE: 20 BRPM | OXYGEN SATURATION: 96 % | HEART RATE: 97 BPM | DIASTOLIC BLOOD PRESSURE: 79 MMHG | WEIGHT: 293 LBS

## 2024-04-23 DIAGNOSIS — O99.810 GLUCOSE INTOLERANCE OF PREGNANCY: ICD-10-CM

## 2024-04-23 DIAGNOSIS — O99.210 OBESITY IN PREGNANCY: Primary | ICD-10-CM

## 2024-04-23 DIAGNOSIS — R03.0 ELEVATED BP WITHOUT DIAGNOSIS OF HYPERTENSION: ICD-10-CM

## 2024-04-23 DIAGNOSIS — O09.93 HIGH-RISK PREGNANCY IN THIRD TRIMESTER: Primary | ICD-10-CM

## 2024-04-23 DIAGNOSIS — O99.210 OBESITY IN PREGNANCY: ICD-10-CM

## 2024-04-23 LAB
FASTING STATUS PATIENT QL REPORTED: YES
GLUCOSE SERPL-MCNC: 79 MG/DL (ref 70–99)
HBA1C MFR BLD: 5.8 %

## 2024-04-23 PROCEDURE — 76816 OB US FOLLOW-UP PER FETUS: CPT | Mod: 26 | Performed by: OBSTETRICS & GYNECOLOGY

## 2024-04-23 PROCEDURE — 82947 ASSAY GLUCOSE BLOOD QUANT: CPT | Performed by: OBSTETRICS & GYNECOLOGY

## 2024-04-23 PROCEDURE — 76816 OB US FOLLOW-UP PER FETUS: CPT

## 2024-04-23 PROCEDURE — 76819 FETAL BIOPHYS PROFIL W/O NST: CPT

## 2024-04-23 PROCEDURE — 76819 FETAL BIOPHYS PROFIL W/O NST: CPT | Mod: 26 | Performed by: OBSTETRICS & GYNECOLOGY

## 2024-04-23 PROCEDURE — 99214 OFFICE O/P EST MOD 30 MIN: CPT | Mod: 25 | Performed by: OBSTETRICS & GYNECOLOGY

## 2024-04-23 PROCEDURE — 36415 COLL VENOUS BLD VENIPUNCTURE: CPT | Performed by: OBSTETRICS & GYNECOLOGY

## 2024-04-23 PROCEDURE — G0463 HOSPITAL OUTPT CLINIC VISIT: HCPCS | Mod: 25 | Performed by: OBSTETRICS & GYNECOLOGY

## 2024-04-23 PROCEDURE — 83036 HEMOGLOBIN GLYCOSYLATED A1C: CPT | Performed by: OBSTETRICS & GYNECOLOGY

## 2024-04-23 PROCEDURE — 99207 PR PRENATAL VISIT: CPT | Mod: 93 | Performed by: OBSTETRICS & GYNECOLOGY

## 2024-04-23 ASSESSMENT — PAIN SCALES - GENERAL: PAINLEVEL: NO PAIN (0)

## 2024-04-23 NOTE — LETTER
"2024       RE: Gabby Phipps  [address]  [facility] [address]     Dear Colleague,    Thank you for referring your patient, Gabby Phipps, to the [facility] [place] [facility]. Please see a copy of my visit note below.    [facility] OB TELEPHONE VISIT    SUBJECTIVE     Gabby Phipps is a 28 year old female who is being evaluated via a billable telephone visit.    Patient opted to conduct today's return visit via telephone    I spoke with: Gabby Phipps    The patient has been notified of following:   \"This telephone visit will be conducted via a call between you and your physician/provider. We have found that certain health care needs can be provided without the need for a physical exam.  This service lets us provide the care you need with a short phone conversation.  If a prescription is necessary we can send it directly to your pharmacy.  If lab work is needed we can place an order for that and you can then stop by our lab to have the test done at a later time.  If during the course of the call the physician/provider feels a telephone visit is not appropriate, you will not be charged for this service.\"     The reason for the telephone visit: Discuss upcoming surgery    28 year old  @36w3d telephone visit to discuss upcoming surgery. Has history of primary CS and scheduled for repeat. Pregnancy complicate by obesity, GDM unknown control, Asthma with history of tracheostomy and prolonged hospitalization for HIN1 in .     Has not been able to check BS yet. Has meter but not strips. Has a lot going on, unsure if she will be able to start checking. Only eats 2 meals per day, does not feel diabetes ed recommendations would work for her with frequent meals. Did meet with PAC clinic earlier this pregnancy who recommend ENT evaluation prior to delivery, unfortunately no visits available.     Reports good fetal " movement, no ctx, lof, vb. Signed tubal papers previously but not planning to have procedure.   ASSESSMENT   Gabby Phipps is a 28 year old , telephone visit to discuss upcoming surgery    PLAN       Reviewed CS details with patient. Will reach out to Anesthesia again to see if we can facilitate ENT evaluation.   Has MFM appointment today for PNC and US. Currently scheduled for delivery at 38wks. Encourage checking BS if able.   All questions answered.   Phone call start: 9:38  Phone call end: 9:50  Phone call duration:  12 minutes      Vianca Farmer MD

## 2024-04-23 NOTE — NURSING NOTE
Gabby seen in clinic today for F/U OB visit and F/U comp/BPP at 36w3d gestation VS WNL Pt reports + fetal movement, see flowsheet. Pt evaluated for  labor, preeclampsia, vaginal bleeding, infection, fetal wellbeing without concerns. Pt denies bleeding/lof/change in vaginal discharge/contractions/headache/vision changes/chest pain/SOB/edema. Pt reports she hasn't checked her BS as she doesn't have the right equipment- test strips. Pt notified to review new pt education in AVS, verbally reviewed highlights. KRISTINE Morgan met with pt and discussed POC. Plan for pt to have C/S at 37 weeks This was rescheduled to  and C/S soap and instructions given. . Future visits scheduled at . Pt discharged stable and ambulatory.

## 2024-04-23 NOTE — PROGRESS NOTES
"[facility] TELEPHONE VISIT    SUBJECTIVE     Gabby Phipps is a 28 year old female who is being evaluated via a billable telephone visit.    Patient opted to conduct today's return visit via telephone    I spoke with: Gabby Phipps    The patient has been notified of following:   \"This telephone visit will be conducted via a call between you and your physician/provider. We have found that certain health care needs can be provided without the need for a physical exam.  This service lets us provide the care you need with a short phone conversation.  If a prescription is necessary we can send it directly to your pharmacy.  If lab work is needed we can place an order for that and you can then stop by our lab to have the test done at a later time.  If during the course of the call the physician/provider feels a telephone visit is not appropriate, you will not be charged for this service.\"     The reason for the telephone visit: Discuss upcoming surgery    28 year old  @36w3d telephone visit to discuss upcoming surgery. Has history of primary CS and scheduled for repeat. Pregnancy complicate by obesity, GDM unknown control, Asthma with history of tracheostomy and prolonged hospitalization for HIN1 in .     Has not been able to check BS yet. Has meter but not strips. Has a lot going on, unsure if she will be able to start checking. Only eats 2 meals per day, does not feel diabetes ed recommendations would work for her with frequent meals. Did meet with PAC clinic earlier this pregnancy who recommend ENT evaluation prior to delivery, unfortunately no visits available.     Reports good fetal movement, no ctx, lof, vb. Signed tubal papers previously but not planning to have procedure.   ASSESSMENT   Gabby Phipps is a 28 year old , telephone visit to discuss upcoming surgery    PLAN       Reviewed CS details with patient. Will reach out to Anesthesia again to see if we can facilitate ENT evaluation. "   Has MFM appointment today for PNC and US. Currently scheduled for delivery at 38wks. Encourage checking BS if able.   All questions answered.   Phone call start: 9:38  Phone call end: 9:50  Phone call duration:  12 minutes      Vianca Farmer MD

## 2024-04-24 ENCOUNTER — TELEPHONE (OUTPATIENT)
Dept: MATERNAL FETAL MEDICINE | Facility: CLINIC | Age: 29
End: 2024-04-24
Payer: COMMERCIAL

## 2024-04-24 NOTE — TELEPHONE ENCOUNTER
Writer called per Dr. Farmer to move C/S to 4/29 at 8:30 am. Pt was informed and in agreement to this plan. Pt is aware to be at the hospital at 6:30 am. Pt was told no food or drink after midnight and verbalized understanding.

## 2024-04-25 ENCOUNTER — ANESTHESIA EVENT (OUTPATIENT)
Dept: OBGYN | Facility: CLINIC | Age: 29
End: 2024-04-25
Payer: COMMERCIAL

## 2024-04-25 DIAGNOSIS — O99.210 OBESITY IN PREGNANCY: ICD-10-CM

## 2024-04-26 RX ORDER — FENTANYL CITRATE-0.9 % NACL/PF 10 MCG/ML
100 PLASTIC BAG, INJECTION (ML) INTRAVENOUS EVERY 5 MIN PRN
Status: CANCELLED | OUTPATIENT
Start: 2024-04-26
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
## 2024-04-29 ENCOUNTER — HOSPITAL ENCOUNTER (INPATIENT)
Facility: CLINIC | Age: 29
LOS: 3 days | Discharge: HOME HEALTH | End: 2024-05-02
Attending: OBSTETRICS & GYNECOLOGY | Admitting: OBSTETRICS & GYNECOLOGY
Payer: COMMERCIAL

## 2024-04-29 ENCOUNTER — ANESTHESIA (OUTPATIENT)
Dept: OBGYN | Facility: CLINIC | Age: 29
End: 2024-04-29
Payer: COMMERCIAL

## 2024-04-29 DIAGNOSIS — Z98.891 S/P CESAREAN SECTION: Primary | ICD-10-CM

## 2024-04-29 DIAGNOSIS — E66.01 MORBID OBESITY (H): ICD-10-CM

## 2024-04-29 DIAGNOSIS — I10 CHRONIC HYPERTENSION: ICD-10-CM

## 2024-04-29 LAB
ABO/RH(D): NORMAL
ALBUMIN SERPL BCG-MCNC: 3.2 G/DL (ref 3.5–5.2)
ALP SERPL-CCNC: 102 U/L (ref 40–150)
ALT SERPL W P-5'-P-CCNC: 10 U/L (ref 0–50)
ANION GAP SERPL CALCULATED.3IONS-SCNC: 12 MMOL/L (ref 7–15)
ANTIBODY SCREEN: NEGATIVE
AST SERPL W P-5'-P-CCNC: 22 U/L (ref 0–45)
BASOPHILS # BLD AUTO: 0 10E3/UL (ref 0–0.2)
BASOPHILS NFR BLD AUTO: 0 %
BILIRUB SERPL-MCNC: 0.2 MG/DL
BUN SERPL-MCNC: 8 MG/DL (ref 6–20)
CALCIUM SERPL-MCNC: 8.3 MG/DL (ref 8.6–10)
CHLORIDE SERPL-SCNC: 104 MMOL/L (ref 98–107)
CREAT SERPL-MCNC: 0.69 MG/DL (ref 0.51–0.95)
DEPRECATED HCO3 PLAS-SCNC: 20 MMOL/L (ref 22–29)
EGFRCR SERPLBLD CKD-EPI 2021: >90 ML/MIN/1.73M2
EOSINOPHIL # BLD AUTO: 0.1 10E3/UL (ref 0–0.7)
EOSINOPHIL NFR BLD AUTO: 1 %
ERYTHROCYTE [DISTWIDTH] IN BLOOD BY AUTOMATED COUNT: 13 % (ref 10–15)
GLUCOSE BLDC GLUCOMTR-MCNC: 126 MG/DL (ref 70–99)
GLUCOSE SERPL-MCNC: 113 MG/DL (ref 70–99)
HCT VFR BLD AUTO: 39.5 % (ref 35–47)
HGB BLD-MCNC: 12.7 G/DL (ref 11.7–15.7)
IMM GRANULOCYTES # BLD: 0.1 10E3/UL
IMM GRANULOCYTES NFR BLD: 1 %
LYMPHOCYTES # BLD AUTO: 2.5 10E3/UL (ref 0.8–5.3)
LYMPHOCYTES NFR BLD AUTO: 22 %
MCH RBC QN AUTO: 29.8 PG (ref 26.5–33)
MCHC RBC AUTO-ENTMCNC: 32.2 G/DL (ref 31.5–36.5)
MCV RBC AUTO: 93 FL (ref 78–100)
MONOCYTES # BLD AUTO: 0.5 10E3/UL (ref 0–1.3)
MONOCYTES NFR BLD AUTO: 4 %
NEUTROPHILS # BLD AUTO: 8.3 10E3/UL (ref 1.6–8.3)
NEUTROPHILS NFR BLD AUTO: 72 %
NRBC # BLD AUTO: 0 10E3/UL
NRBC BLD AUTO-RTO: 0 /100
PLATELET # BLD AUTO: 216 10E3/UL (ref 150–450)
POTASSIUM SERPL-SCNC: 4.6 MMOL/L (ref 3.4–5.3)
PROT SERPL-MCNC: 6.3 G/DL (ref 6.4–8.3)
RBC # BLD AUTO: 4.26 10E6/UL (ref 3.8–5.2)
SODIUM SERPL-SCNC: 136 MMOL/L (ref 135–145)
SPECIMEN EXPIRATION DATE: NORMAL
T PALLIDUM AB SER QL: NONREACTIVE
WBC # BLD AUTO: 11.5 10E3/UL (ref 4–11)

## 2024-04-29 PROCEDURE — 272N000001 HC OR GENERAL SUPPLY STERILE: Performed by: OBSTETRICS & GYNECOLOGY

## 2024-04-29 PROCEDURE — 36415 COLL VENOUS BLD VENIPUNCTURE: CPT | Performed by: OBSTETRICS & GYNECOLOGY

## 2024-04-29 PROCEDURE — 250N000009 HC RX 250: Performed by: STUDENT IN AN ORGANIZED HEALTH CARE EDUCATION/TRAINING PROGRAM

## 2024-04-29 PROCEDURE — 250N000011 HC RX IP 250 OP 636: Performed by: STUDENT IN AN ORGANIZED HEALTH CARE EDUCATION/TRAINING PROGRAM

## 2024-04-29 PROCEDURE — 86900 BLOOD TYPING SEROLOGIC ABO: CPT

## 2024-04-29 PROCEDURE — 258N000003 HC RX IP 258 OP 636: Performed by: STUDENT IN AN ORGANIZED HEALTH CARE EDUCATION/TRAINING PROGRAM

## 2024-04-29 PROCEDURE — 370N000017 HC ANESTHESIA TECHNICAL FEE, PER MIN: Performed by: OBSTETRICS & GYNECOLOGY

## 2024-04-29 PROCEDURE — C9290 INJ, BUPIVACAINE LIPOSOME: HCPCS | Performed by: STUDENT IN AN ORGANIZED HEALTH CARE EDUCATION/TRAINING PROGRAM

## 2024-04-29 PROCEDURE — 999N000141 HC STATISTIC PRE-PROCEDURE NURSING ASSESSMENT: Performed by: OBSTETRICS & GYNECOLOGY

## 2024-04-29 PROCEDURE — 999N000127 HC STATISTIC PERIPHERAL IV START W US GUIDANCE

## 2024-04-29 PROCEDURE — 710N000010 HC RECOVERY PHASE 1, LEVEL 2, PER MIN: Performed by: OBSTETRICS & GYNECOLOGY

## 2024-04-29 PROCEDURE — 271N000001 HC OR GENERAL SUPPLY NON-STERILE: Performed by: OBSTETRICS & GYNECOLOGY

## 2024-04-29 PROCEDURE — 59510 CESAREAN DELIVERY: CPT | Performed by: STUDENT IN AN ORGANIZED HEALTH CARE EDUCATION/TRAINING PROGRAM

## 2024-04-29 PROCEDURE — 999N000040 HC STATISTIC CONSULT NO CHARGE VASC ACCESS

## 2024-04-29 PROCEDURE — 85049 AUTOMATED PLATELET COUNT: CPT | Performed by: OBSTETRICS & GYNECOLOGY

## 2024-04-29 PROCEDURE — 250N000013 HC RX MED GY IP 250 OP 250 PS 637

## 2024-04-29 PROCEDURE — 120N000002 HC R&B MED SURG/OB UMMC

## 2024-04-29 PROCEDURE — 258N000003 HC RX IP 258 OP 636

## 2024-04-29 PROCEDURE — 84155 ASSAY OF PROTEIN SERUM: CPT

## 2024-04-29 PROCEDURE — 84075 ASSAY ALKALINE PHOSPHATASE: CPT

## 2024-04-29 PROCEDURE — 250N000011 HC RX IP 250 OP 636

## 2024-04-29 PROCEDURE — 999N000285 HC STATISTIC VASC ACCESS LAB DRAW WITH PIV START

## 2024-04-29 PROCEDURE — 86780 TREPONEMA PALLIDUM: CPT

## 2024-04-29 PROCEDURE — 59514 CESAREAN DELIVERY ONLY: CPT | Mod: GC | Performed by: OBSTETRICS & GYNECOLOGY

## 2024-04-29 PROCEDURE — 360N000076 HC SURGERY LEVEL 3, PER MIN: Performed by: OBSTETRICS & GYNECOLOGY

## 2024-04-29 RX ORDER — AMOXICILLIN 250 MG
1 CAPSULE ORAL 2 TIMES DAILY
Status: DISCONTINUED | OUTPATIENT
Start: 2024-04-29 | End: 2024-05-02 | Stop reason: HOSPADM

## 2024-04-29 RX ORDER — OXYTOCIN 10 [USP'U]/ML
10 INJECTION, SOLUTION INTRAMUSCULAR; INTRAVENOUS
Status: DISCONTINUED | OUTPATIENT
Start: 2024-04-29 | End: 2024-05-02 | Stop reason: HOSPADM

## 2024-04-29 RX ORDER — ONDANSETRON 4 MG/1
4 TABLET, ORALLY DISINTEGRATING ORAL EVERY 30 MIN PRN
Status: DISCONTINUED | OUTPATIENT
Start: 2024-04-29 | End: 2024-04-29 | Stop reason: HOSPADM

## 2024-04-29 RX ORDER — CEFAZOLIN SODIUM/WATER 3 G/30 ML
3 SYRINGE (ML) INTRAVENOUS SEE ADMIN INSTRUCTIONS
Status: DISCONTINUED | OUTPATIENT
Start: 2024-04-29 | End: 2024-04-29

## 2024-04-29 RX ORDER — ACETAMINOPHEN 325 MG/1
975 TABLET ORAL ONCE
Status: COMPLETED | OUTPATIENT
Start: 2024-04-29 | End: 2024-04-29

## 2024-04-29 RX ORDER — OXYTOCIN/0.9 % SODIUM CHLORIDE 30/500 ML
340 PLASTIC BAG, INJECTION (ML) INTRAVENOUS CONTINUOUS PRN
Status: DISCONTINUED | OUTPATIENT
Start: 2024-04-29 | End: 2024-04-29

## 2024-04-29 RX ORDER — FLUMAZENIL 0.1 MG/ML
0.2 INJECTION, SOLUTION INTRAVENOUS
Status: CANCELLED | OUTPATIENT
Start: 2024-04-29

## 2024-04-29 RX ORDER — NALOXONE HYDROCHLORIDE 0.4 MG/ML
0.2 INJECTION, SOLUTION INTRAMUSCULAR; INTRAVENOUS; SUBCUTANEOUS
Status: CANCELLED | OUTPATIENT
Start: 2024-04-29

## 2024-04-29 RX ORDER — FENTANYL CITRATE 50 UG/ML
50 INJECTION, SOLUTION INTRAMUSCULAR; INTRAVENOUS EVERY 5 MIN PRN
Status: DISCONTINUED | OUTPATIENT
Start: 2024-04-29 | End: 2024-04-29 | Stop reason: HOSPADM

## 2024-04-29 RX ORDER — ONDANSETRON 2 MG/ML
4 INJECTION INTRAMUSCULAR; INTRAVENOUS EVERY 30 MIN PRN
Status: DISCONTINUED | OUTPATIENT
Start: 2024-04-29 | End: 2024-04-29 | Stop reason: HOSPADM

## 2024-04-29 RX ORDER — METHYLERGONOVINE MALEATE 0.2 MG/ML
200 INJECTION INTRAVENOUS
Status: DISCONTINUED | OUTPATIENT
Start: 2024-04-29 | End: 2024-04-29

## 2024-04-29 RX ORDER — DEXTROSE, SODIUM CHLORIDE, SODIUM LACTATE, POTASSIUM CHLORIDE, AND CALCIUM CHLORIDE 5; .6; .31; .03; .02 G/100ML; G/100ML; G/100ML; G/100ML; G/100ML
INJECTION, SOLUTION INTRAVENOUS CONTINUOUS
Status: DISCONTINUED | OUTPATIENT
Start: 2024-04-29 | End: 2024-05-02 | Stop reason: HOSPADM

## 2024-04-29 RX ORDER — LOPERAMIDE HCL 2 MG
4 CAPSULE ORAL
Status: DISCONTINUED | OUTPATIENT
Start: 2024-04-29 | End: 2024-04-29

## 2024-04-29 RX ORDER — LIDOCAINE 40 MG/G
CREAM TOPICAL
Status: DISCONTINUED | OUTPATIENT
Start: 2024-04-29 | End: 2024-04-29

## 2024-04-29 RX ORDER — METOCLOPRAMIDE HYDROCHLORIDE 5 MG/ML
10 INJECTION INTRAMUSCULAR; INTRAVENOUS EVERY 6 HOURS PRN
Status: DISCONTINUED | OUTPATIENT
Start: 2024-04-29 | End: 2024-05-02 | Stop reason: HOSPADM

## 2024-04-29 RX ORDER — MISOPROSTOL 200 UG/1
400 TABLET ORAL
Status: DISCONTINUED | OUTPATIENT
Start: 2024-04-29 | End: 2024-04-29

## 2024-04-29 RX ORDER — SIMETHICONE 80 MG
80 TABLET,CHEWABLE ORAL 4 TIMES DAILY PRN
Status: DISCONTINUED | OUTPATIENT
Start: 2024-04-29 | End: 2024-05-02 | Stop reason: HOSPADM

## 2024-04-29 RX ORDER — CARBOPROST TROMETHAMINE 250 UG/ML
250 INJECTION, SOLUTION INTRAMUSCULAR
Status: DISCONTINUED | OUTPATIENT
Start: 2024-04-29 | End: 2024-04-29

## 2024-04-29 RX ORDER — MORPHINE SULFATE 1 MG/ML
INJECTION, SOLUTION EPIDURAL; INTRATHECAL; INTRAVENOUS
Status: COMPLETED | OUTPATIENT
Start: 2024-04-29 | End: 2024-04-29

## 2024-04-29 RX ORDER — ONDANSETRON 2 MG/ML
4 INJECTION INTRAMUSCULAR; INTRAVENOUS EVERY 6 HOURS PRN
Status: DISCONTINUED | OUTPATIENT
Start: 2024-04-29 | End: 2024-05-02 | Stop reason: HOSPADM

## 2024-04-29 RX ORDER — METHYLERGONOVINE MALEATE 0.2 MG/ML
200 INJECTION INTRAVENOUS
Status: DISCONTINUED | OUTPATIENT
Start: 2024-04-29 | End: 2024-05-02 | Stop reason: HOSPADM

## 2024-04-29 RX ORDER — MISOPROSTOL 200 UG/1
800 TABLET ORAL
Status: DISCONTINUED | OUTPATIENT
Start: 2024-04-29 | End: 2024-05-02 | Stop reason: HOSPADM

## 2024-04-29 RX ORDER — ENOXAPARIN SODIUM 100 MG/ML
40 INJECTION SUBCUTANEOUS EVERY 24 HOURS
Status: DISCONTINUED | OUTPATIENT
Start: 2024-04-30 | End: 2024-04-29

## 2024-04-29 RX ORDER — METHYLERGONOVINE MALEATE 0.2 MG/ML
INJECTION INTRAVENOUS PRN
Status: DISCONTINUED | OUTPATIENT
Start: 2024-04-29 | End: 2024-04-29

## 2024-04-29 RX ORDER — SODIUM CHLORIDE, SODIUM LACTATE, POTASSIUM CHLORIDE, CALCIUM CHLORIDE 600; 310; 30; 20 MG/100ML; MG/100ML; MG/100ML; MG/100ML
INJECTION, SOLUTION INTRAVENOUS CONTINUOUS PRN
Status: DISCONTINUED | OUTPATIENT
Start: 2024-04-29 | End: 2024-04-29

## 2024-04-29 RX ORDER — KETOROLAC TROMETHAMINE 30 MG/ML
30 INJECTION, SOLUTION INTRAMUSCULAR; INTRAVENOUS EVERY 6 HOURS
Status: COMPLETED | OUTPATIENT
Start: 2024-04-29 | End: 2024-04-30

## 2024-04-29 RX ORDER — MISOPROSTOL 200 UG/1
400 TABLET ORAL
Status: DISCONTINUED | OUTPATIENT
Start: 2024-04-29 | End: 2024-05-02 | Stop reason: HOSPADM

## 2024-04-29 RX ORDER — CEFAZOLIN SODIUM/WATER 3 G/30 ML
3 SYRINGE (ML) INTRAVENOUS
Status: COMPLETED | OUTPATIENT
Start: 2024-04-29 | End: 2024-04-29

## 2024-04-29 RX ORDER — NALOXONE HYDROCHLORIDE 0.4 MG/ML
0.4 INJECTION, SOLUTION INTRAMUSCULAR; INTRAVENOUS; SUBCUTANEOUS
Status: CANCELLED | OUTPATIENT
Start: 2024-04-29

## 2024-04-29 RX ORDER — DEXTROSE MONOHYDRATE 25 G/50ML
25-50 INJECTION, SOLUTION INTRAVENOUS
Status: DISCONTINUED | OUTPATIENT
Start: 2024-04-29 | End: 2024-05-02 | Stop reason: HOSPADM

## 2024-04-29 RX ORDER — FENTANYL CITRATE 50 UG/ML
INJECTION, SOLUTION INTRAMUSCULAR; INTRAVENOUS
Status: COMPLETED | OUTPATIENT
Start: 2024-04-29 | End: 2024-04-29

## 2024-04-29 RX ORDER — NICOTINE 21 MG/24HR
1 PATCH, TRANSDERMAL 24 HOURS TRANSDERMAL DAILY
Status: DISCONTINUED | OUTPATIENT
Start: 2024-04-29 | End: 2024-05-02 | Stop reason: HOSPADM

## 2024-04-29 RX ORDER — HYDROMORPHONE HCL IN WATER/PF 6 MG/30 ML
0.2 PATIENT CONTROLLED ANALGESIA SYRINGE INTRAVENOUS EVERY 5 MIN PRN
Status: DISCONTINUED | OUTPATIENT
Start: 2024-04-29 | End: 2024-04-29 | Stop reason: HOSPADM

## 2024-04-29 RX ORDER — CARBOPROST TROMETHAMINE 250 UG/ML
250 INJECTION, SOLUTION INTRAMUSCULAR
Status: DISCONTINUED | OUTPATIENT
Start: 2024-04-29 | End: 2024-05-02 | Stop reason: HOSPADM

## 2024-04-29 RX ORDER — KETOROLAC TROMETHAMINE 30 MG/ML
INJECTION, SOLUTION INTRAMUSCULAR; INTRAVENOUS PRN
Status: DISCONTINUED | OUTPATIENT
Start: 2024-04-29 | End: 2024-04-29

## 2024-04-29 RX ORDER — OXYTOCIN 10 [USP'U]/ML
10 INJECTION, SOLUTION INTRAMUSCULAR; INTRAVENOUS
Status: DISCONTINUED | OUTPATIENT
Start: 2024-04-29 | End: 2024-04-29

## 2024-04-29 RX ORDER — ENOXAPARIN SODIUM 100 MG/ML
40 INJECTION SUBCUTANEOUS EVERY 12 HOURS
Status: DISCONTINUED | OUTPATIENT
Start: 2024-04-30 | End: 2024-05-02 | Stop reason: HOSPADM

## 2024-04-29 RX ORDER — HYDROCORTISONE 25 MG/G
CREAM TOPICAL 3 TIMES DAILY PRN
Status: DISCONTINUED | OUTPATIENT
Start: 2024-04-29 | End: 2024-05-02 | Stop reason: HOSPADM

## 2024-04-29 RX ORDER — OXYTOCIN/0.9 % SODIUM CHLORIDE 30/500 ML
340 PLASTIC BAG, INJECTION (ML) INTRAVENOUS CONTINUOUS PRN
Status: DISCONTINUED | OUTPATIENT
Start: 2024-04-29 | End: 2024-05-02 | Stop reason: HOSPADM

## 2024-04-29 RX ORDER — ALBUTEROL SULFATE 0.83 MG/ML
1.25 SOLUTION RESPIRATORY (INHALATION) EVERY 6 HOURS PRN
Status: DISCONTINUED | OUTPATIENT
Start: 2024-04-29 | End: 2024-05-02 | Stop reason: HOSPADM

## 2024-04-29 RX ORDER — FENTANYL CITRATE 50 UG/ML
25 INJECTION, SOLUTION INTRAMUSCULAR; INTRAVENOUS EVERY 5 MIN PRN
Status: DISCONTINUED | OUTPATIENT
Start: 2024-04-29 | End: 2024-04-29 | Stop reason: HOSPADM

## 2024-04-29 RX ORDER — BUPIVACAINE HYDROCHLORIDE 7.5 MG/ML
INJECTION, SOLUTION INTRASPINAL
Status: COMPLETED | OUTPATIENT
Start: 2024-04-29 | End: 2024-04-29

## 2024-04-29 RX ORDER — NALOXONE HYDROCHLORIDE 0.4 MG/ML
0.1 INJECTION, SOLUTION INTRAMUSCULAR; INTRAVENOUS; SUBCUTANEOUS
Status: DISCONTINUED | OUTPATIENT
Start: 2024-04-29 | End: 2024-04-29 | Stop reason: HOSPADM

## 2024-04-29 RX ORDER — LIDOCAINE 40 MG/G
CREAM TOPICAL
Status: DISCONTINUED | OUTPATIENT
Start: 2024-04-29 | End: 2024-05-02 | Stop reason: HOSPADM

## 2024-04-29 RX ORDER — DEXMEDETOMIDINE HYDROCHLORIDE 4 UG/ML
INJECTION, SOLUTION INTRAVENOUS PRN
Status: DISCONTINUED | OUTPATIENT
Start: 2024-04-29 | End: 2024-04-29

## 2024-04-29 RX ORDER — ONDANSETRON 2 MG/ML
4 INJECTION INTRAMUSCULAR; INTRAVENOUS ONCE
Status: COMPLETED | OUTPATIENT
Start: 2024-04-29 | End: 2024-04-29

## 2024-04-29 RX ORDER — ONDANSETRON 2 MG/ML
4 INJECTION INTRAMUSCULAR; INTRAVENOUS ONCE
Status: CANCELLED | OUTPATIENT
Start: 2024-04-29

## 2024-04-29 RX ORDER — ALBUTEROL SULFATE 90 UG/1
1-2 AEROSOL, METERED RESPIRATORY (INHALATION) EVERY 4 HOURS PRN
Status: DISCONTINUED | OUTPATIENT
Start: 2024-04-29 | End: 2024-05-02 | Stop reason: HOSPADM

## 2024-04-29 RX ORDER — PROCHLORPERAZINE MALEATE 10 MG
10 TABLET ORAL EVERY 6 HOURS PRN
Status: DISCONTINUED | OUTPATIENT
Start: 2024-04-29 | End: 2024-05-02 | Stop reason: HOSPADM

## 2024-04-29 RX ORDER — PROCHLORPERAZINE 25 MG
25 SUPPOSITORY, RECTAL RECTAL EVERY 12 HOURS PRN
Status: DISCONTINUED | OUTPATIENT
Start: 2024-04-29 | End: 2024-05-02 | Stop reason: HOSPADM

## 2024-04-29 RX ORDER — FENTANYL CITRATE 50 UG/ML
25-50 INJECTION, SOLUTION INTRAMUSCULAR; INTRAVENOUS
Status: CANCELLED | OUTPATIENT
Start: 2024-04-29

## 2024-04-29 RX ORDER — NICOTINE POLACRILEX 4 MG
15-30 LOZENGE BUCCAL
Status: DISCONTINUED | OUTPATIENT
Start: 2024-04-29 | End: 2024-05-02 | Stop reason: HOSPADM

## 2024-04-29 RX ORDER — NALBUPHINE HYDROCHLORIDE 20 MG/ML
2.5-5 INJECTION, SOLUTION INTRAMUSCULAR; INTRAVENOUS; SUBCUTANEOUS EVERY 6 HOURS PRN
Status: DISCONTINUED | OUTPATIENT
Start: 2024-04-29 | End: 2024-05-02 | Stop reason: HOSPADM

## 2024-04-29 RX ORDER — METOCLOPRAMIDE 10 MG/1
10 TABLET ORAL EVERY 6 HOURS PRN
Status: DISCONTINUED | OUTPATIENT
Start: 2024-04-29 | End: 2024-05-02 | Stop reason: HOSPADM

## 2024-04-29 RX ORDER — SODIUM CHLORIDE, SODIUM LACTATE, POTASSIUM CHLORIDE, CALCIUM CHLORIDE 600; 310; 30; 20 MG/100ML; MG/100ML; MG/100ML; MG/100ML
INJECTION, SOLUTION INTRAVENOUS CONTINUOUS
Status: DISCONTINUED | OUTPATIENT
Start: 2024-04-29 | End: 2024-04-29

## 2024-04-29 RX ORDER — SODIUM CHLORIDE, SODIUM LACTATE, POTASSIUM CHLORIDE, CALCIUM CHLORIDE 600; 310; 30; 20 MG/100ML; MG/100ML; MG/100ML; MG/100ML
INJECTION, SOLUTION INTRAVENOUS
Status: COMPLETED
Start: 2024-04-29 | End: 2024-04-29

## 2024-04-29 RX ORDER — LOPERAMIDE HCL 2 MG
4 CAPSULE ORAL
Status: DISCONTINUED | OUTPATIENT
Start: 2024-04-29 | End: 2024-05-02 | Stop reason: HOSPADM

## 2024-04-29 RX ORDER — AMOXICILLIN 250 MG
2 CAPSULE ORAL 2 TIMES DAILY
Status: DISCONTINUED | OUTPATIENT
Start: 2024-04-29 | End: 2024-05-02 | Stop reason: HOSPADM

## 2024-04-29 RX ORDER — TRANEXAMIC ACID 10 MG/ML
1 INJECTION, SOLUTION INTRAVENOUS EVERY 30 MIN PRN
Status: DISCONTINUED | OUTPATIENT
Start: 2024-04-29 | End: 2024-05-02 | Stop reason: HOSPADM

## 2024-04-29 RX ORDER — TRANEXAMIC ACID 10 MG/ML
1 INJECTION, SOLUTION INTRAVENOUS EVERY 30 MIN PRN
Status: DISCONTINUED | OUTPATIENT
Start: 2024-04-29 | End: 2024-04-29

## 2024-04-29 RX ORDER — ONDANSETRON 4 MG/1
4 TABLET, ORALLY DISINTEGRATING ORAL EVERY 6 HOURS PRN
Status: DISCONTINUED | OUTPATIENT
Start: 2024-04-29 | End: 2024-05-02 | Stop reason: HOSPADM

## 2024-04-29 RX ORDER — ACETAMINOPHEN 325 MG/1
975 TABLET ORAL EVERY 6 HOURS
Status: DISCONTINUED | OUTPATIENT
Start: 2024-04-29 | End: 2024-05-02 | Stop reason: HOSPADM

## 2024-04-29 RX ORDER — MISOPROSTOL 200 UG/1
800 TABLET ORAL
Status: DISCONTINUED | OUTPATIENT
Start: 2024-04-29 | End: 2024-04-29

## 2024-04-29 RX ORDER — BUPIVACAINE HYDROCHLORIDE 2.5 MG/ML
INJECTION, SOLUTION EPIDURAL; INFILTRATION; INTRACAUDAL
Status: COMPLETED | OUTPATIENT
Start: 2024-04-29 | End: 2024-04-29

## 2024-04-29 RX ORDER — HYDROMORPHONE HCL IN WATER/PF 6 MG/30 ML
0.4 PATIENT CONTROLLED ANALGESIA SYRINGE INTRAVENOUS EVERY 5 MIN PRN
Status: DISCONTINUED | OUTPATIENT
Start: 2024-04-29 | End: 2024-04-29 | Stop reason: HOSPADM

## 2024-04-29 RX ORDER — SODIUM CHLORIDE, SODIUM LACTATE, POTASSIUM CHLORIDE, CALCIUM CHLORIDE 600; 310; 30; 20 MG/100ML; MG/100ML; MG/100ML; MG/100ML
INJECTION, SOLUTION INTRAVENOUS CONTINUOUS
Status: DISCONTINUED | OUTPATIENT
Start: 2024-04-29 | End: 2024-04-29 | Stop reason: HOSPADM

## 2024-04-29 RX ORDER — OXYTOCIN/0.9 % SODIUM CHLORIDE 30/500 ML
PLASTIC BAG, INJECTION (ML) INTRAVENOUS PRN
Status: DISCONTINUED | OUTPATIENT
Start: 2024-04-29 | End: 2024-04-29

## 2024-04-29 RX ORDER — BISACODYL 10 MG
10 SUPPOSITORY, RECTAL RECTAL DAILY PRN
Status: DISCONTINUED | OUTPATIENT
Start: 2024-05-01 | End: 2024-05-02 | Stop reason: HOSPADM

## 2024-04-29 RX ORDER — MODIFIED LANOLIN
OINTMENT (GRAM) TOPICAL
Status: DISCONTINUED | OUTPATIENT
Start: 2024-04-29 | End: 2024-05-02 | Stop reason: HOSPADM

## 2024-04-29 RX ORDER — CITRIC ACID/SODIUM CITRATE 334-500MG
30 SOLUTION, ORAL ORAL
Status: COMPLETED | OUTPATIENT
Start: 2024-04-29 | End: 2024-04-29

## 2024-04-29 RX ORDER — OXYCODONE HYDROCHLORIDE 5 MG/1
5 TABLET ORAL EVERY 4 HOURS PRN
Status: DISCONTINUED | OUTPATIENT
Start: 2024-04-29 | End: 2024-05-02 | Stop reason: HOSPADM

## 2024-04-29 RX ORDER — LOPERAMIDE HCL 2 MG
2 CAPSULE ORAL
Status: DISCONTINUED | OUTPATIENT
Start: 2024-04-29 | End: 2024-04-29

## 2024-04-29 RX ORDER — IBUPROFEN 800 MG/1
800 TABLET, FILM COATED ORAL EVERY 6 HOURS
Status: DISCONTINUED | OUTPATIENT
Start: 2024-04-30 | End: 2024-05-02 | Stop reason: HOSPADM

## 2024-04-29 RX ORDER — LOPERAMIDE HCL 2 MG
2 CAPSULE ORAL
Status: DISCONTINUED | OUTPATIENT
Start: 2024-04-29 | End: 2024-05-02 | Stop reason: HOSPADM

## 2024-04-29 RX ORDER — OXYTOCIN/0.9 % SODIUM CHLORIDE 30/500 ML
100-340 PLASTIC BAG, INJECTION (ML) INTRAVENOUS CONTINUOUS PRN
Status: DISCONTINUED | OUTPATIENT
Start: 2024-04-29 | End: 2024-05-02 | Stop reason: HOSPADM

## 2024-04-29 RX ADMIN — SODIUM CHLORIDE, POTASSIUM CHLORIDE, SODIUM LACTATE AND CALCIUM CHLORIDE 500 ML: 600; 310; 30; 20 INJECTION, SOLUTION INTRAVENOUS at 19:06

## 2024-04-29 RX ADMIN — SODIUM CHLORIDE, POTASSIUM CHLORIDE, SODIUM LACTATE AND CALCIUM CHLORIDE 500 ML: 600; 310; 30; 20 INJECTION, SOLUTION INTRAVENOUS at 08:23

## 2024-04-29 RX ADMIN — FENTANYL CITRATE 15 MCG: 50 INJECTION INTRAMUSCULAR; INTRAVENOUS at 10:10

## 2024-04-29 RX ADMIN — MORPHINE SULFATE 0.15 MG: 1 INJECTION EPIDURAL; INTRATHECAL; INTRAVENOUS at 10:10

## 2024-04-29 RX ADMIN — BUPIVACAINE HYDROCHLORIDE IN DEXTROSE 1.6 ML: 7.5 INJECTION, SOLUTION SUBARACHNOID at 10:10

## 2024-04-29 RX ADMIN — NALBUPHINE HYDROCHLORIDE 2.6 MG: 20 INJECTION, SOLUTION INTRAMUSCULAR; INTRAVENOUS; SUBCUTANEOUS at 18:24

## 2024-04-29 RX ADMIN — SENNOSIDES AND DOCUSATE SODIUM 2 TABLET: 8.6; 5 TABLET ORAL at 20:07

## 2024-04-29 RX ADMIN — Medication 300 ML: at 10:37

## 2024-04-29 RX ADMIN — SODIUM CHLORIDE, POTASSIUM CHLORIDE, SODIUM LACTATE AND CALCIUM CHLORIDE: 600; 310; 30; 20 INJECTION, SOLUTION INTRAVENOUS at 09:28

## 2024-04-29 RX ADMIN — ONDANSETRON 4 MG: 2 INJECTION INTRAMUSCULAR; INTRAVENOUS at 08:49

## 2024-04-29 RX ADMIN — SODIUM CITRATE AND CITRIC ACID MONOHYDRATE 30 ML: 500; 334 SOLUTION ORAL at 09:12

## 2024-04-29 RX ADMIN — KETOROLAC TROMETHAMINE 30 MG: 30 INJECTION, SOLUTION INTRAMUSCULAR at 16:51

## 2024-04-29 RX ADMIN — BUPIVACAINE HYDROCHLORIDE 20 ML: 2.5 INJECTION, SOLUTION EPIDURAL; INFILTRATION; INTRACAUDAL at 11:36

## 2024-04-29 RX ADMIN — PHENYLEPHRINE HYDROCHLORIDE 100 MCG/MIN: 10 INJECTION INTRAVENOUS at 10:03

## 2024-04-29 RX ADMIN — Medication 3 G: at 10:11

## 2024-04-29 RX ADMIN — PHENYLEPHRINE HYDROCHLORIDE 100 MCG: 10 INJECTION INTRAVENOUS at 10:08

## 2024-04-29 RX ADMIN — METHYLERGONOVINE MALEATE 200 MCG: 0.2 INJECTION INTRAVENOUS at 11:20

## 2024-04-29 RX ADMIN — ACETAMINOPHEN 975 MG: 325 TABLET, FILM COATED ORAL at 23:11

## 2024-04-29 RX ADMIN — SIMETHICONE 80 MG: 80 TABLET, CHEWABLE ORAL at 20:08

## 2024-04-29 RX ADMIN — PHENYLEPHRINE HYDROCHLORIDE 100 MCG: 10 INJECTION INTRAVENOUS at 10:15

## 2024-04-29 RX ADMIN — KETOROLAC TROMETHAMINE 30 MG: 30 INJECTION, SOLUTION INTRAMUSCULAR at 10:59

## 2024-04-29 RX ADMIN — ACETAMINOPHEN 975 MG: 325 TABLET, FILM COATED ORAL at 16:51

## 2024-04-29 RX ADMIN — ACETAMINOPHEN 975 MG: 325 TABLET, FILM COATED ORAL at 09:12

## 2024-04-29 RX ADMIN — KETOROLAC TROMETHAMINE 30 MG: 30 INJECTION, SOLUTION INTRAMUSCULAR at 23:12

## 2024-04-29 RX ADMIN — Medication 12 MCG: at 10:20

## 2024-04-29 RX ADMIN — BUPIVACAINE 20 ML: 13.3 INJECTION, SUSPENSION, LIPOSOMAL INFILTRATION at 11:36

## 2024-04-29 RX ADMIN — FAMOTIDINE 20 MG: 20 INJECTION, SOLUTION INTRAVENOUS at 08:48

## 2024-04-29 ASSESSMENT — ACTIVITIES OF DAILY LIVING (ADL)
ADLS_ACUITY_SCORE: 24
ADLS_ACUITY_SCORE: 35
ADLS_ACUITY_SCORE: 24
ADLS_ACUITY_SCORE: 35
ADLS_ACUITY_SCORE: 41
ADLS_ACUITY_SCORE: 24
ADLS_ACUITY_SCORE: 35
ADLS_ACUITY_SCORE: 35
ADLS_ACUITY_SCORE: 24
ADLS_ACUITY_SCORE: 35
ADLS_ACUITY_SCORE: 35
ADLS_ACUITY_SCORE: 33

## 2024-04-29 NOTE — ANESTHESIA PROCEDURE NOTES
"Combined intrathecal/epidural Procedure Note    Pre-Procedure   Staff -        Anesthesiologist:  Tomeka Whitmore MD       Resident/Fellow: Lisa Sahu MD       Performed By: resident       Location: OR       Pre-Anesthestic Checklist: patient identified, IV checked, risks and benefits discussed, informed consent, monitors and equipment checked, pre-op evaluation, at physician/surgeon's request and post-op pain management  Timeout:       Correct Patient: Yes        Correct Procedure: Yes        Correct Site: Yes        Correct Position: Yes   Procedure Documentation  Procedure: combined intrathecal/epidural       Patient Position: sitting       Skin prep: Chloraprep       Local skin infiltrated with 8 mL of 1% lidocaine.        Insertion Site: L2-3. (midline approach).       Technique: LORT saline        MARINE at 10 cm.       Needle Type: ToOATSystemsy       Needle Gauge: 17.        Needle Length (Inches): 5        Spinal Needle Type: Pencan       Spinal Needle (gauge): 27        Spinal Needle Length (inches): 5       Catheter: 19 G.          Catheter threaded easily.         5 cm epidural space.         Threaded 15 cm at skin.         # of attempts: 2 and  # of redirects:  1    Assessment/Narrative         Paresthesias: Resolved.       Insertion/Infusion Method: LORT saline       Sensory Level Left: T5.       Sensory Level Right: T5.       CSF fluid: with Spinal needle.CSF fluid removed: with Epidural needle - not with Epidural needle.    Medication(s) Administered   Fentanyl PF (Intrathecal) - Intrathecal   15 mcg - 4/29/2024 10:10:00 AM  0.75% Hyperbaric Bupivacaine (Intrathecal) - Intrathecal   1.6 mL - 4/29/2024 10:10:00 AM  Morphine PF 1 mg/mL (Intrathecal) - Intrathecal   0.15 mg - 4/29/2024 10:10:00 AM   Comments:  With 100 mcg epinephrine in spinal dose      FOR [facility] ([facility] ONLY:   Pain Team Contact information: please page the Pain Team Via [employer]. Search \"Pain\". During daytime hours, please page the " attending first. At night please page the resident first.

## 2024-04-29 NOTE — ANESTHESIA PROCEDURE NOTES
"TAP Procedure Note    Pre-Procedure   Staff -        Anesthesiologist:  Tomeka Whitmore MD       Resident/Fellow: Lisa Sahu MD       Performed By: resident       Location: OR       Pre-Anesthestic Checklist: patient identified, IV checked, site marked, risks and benefits discussed, informed consent, monitors and equipment checked, pre-op evaluation, at physician/surgeon's request and post-op pain management  Timeout:       Correct Patient: Yes        Correct Procedure: Yes        Correct Site: Yes        Correct Position: Yes        Correct Laterality: Yes        Site Marked: Yes  Procedure Documentation  Procedure: TAP       Diagnosis: POST OPERATIVE PAIN       Laterality: bilateral       Patient Position: supine       Patient Prep/Sterile Barriers: sterile gloves, mask       Skin prep: Chloraprep       Needle Type: short bevel       Needle Gauge: 21.        Needle Length (millimeters): 110        Ultrasound guided       1. Ultrasound was used to identify targeted nerve, plexus, vascular marker, or fascial plane and place a needle adjacent to it in real-time.       2. Ultrasound was used to visualize the spread of anesthetic in close proximity to the above referenced structure.       3. A permanent image is entered into the patient's record.    Assessment/Narrative         The placement was negative for: blood aspirated, painful injection and site bleeding       Paresthesias: No.       Bolus given via needle..        Secured via.        Insertion/Infusion Method: Single Shot       Complications: none       Injection made incrementally with aspirations every 5 mL.    Medication(s) Administered   Bupivacaine 0.25% PF (Infiltration) - Infiltration   20 mL - 4/29/2024 11:36:00 AM  Bupivacaine liposome (Exparel) 1.3% LA inj susp (Infiltration) - Infiltration   20 mL - 4/29/2024 11:36:00 AM    FOR [facility] ([facility]) ONLY:   Pain Team Contact information: please page the Pain Team Via [employer]. Search \"Pain\". During " daytime hours, please page the attending first. At night please page the resident first.

## 2024-04-29 NOTE — ANESTHESIA POSTPROCEDURE EVALUATION
Patient: Gabby Phipps    Procedure: Procedure(s):   section       Anesthesia Type:  CSE    Note:  Disposition: Admission   Postop Pain Control: Uneventful            Sign Out: Well controlled pain   PONV: No   Neuro/Psych:    Airway/Respiratory: Uneventful            Sign Out: Acceptable/Baseline resp. status   CV/Hemodynamics: Uneventful            Sign Out: Acceptable CV status; No obvious hypovolemia; No obvious fluid overload   Other NRE: NONE   DID A NON-ROUTINE EVENT OCCUR? No           Last vitals:  Vitals Value Taken Time   /64 24 1250   Temp     Pulse 72 24 1257   Resp 26 24 1257   SpO2 97 % 24 1257   Vitals shown include unfiled device data.    Electronically Signed By: Tomeka Whitmore MD  2024  12:58 PM

## 2024-04-29 NOTE — ANESTHESIA CARE TRANSFER NOTE
Patient: Gabby Phipps    Procedure: Procedure(s):   section       Diagnosis: Morbid obesity (H) [E66.01]  Diagnosis Additional Information: No value filed.    Anesthesia Type:   CSE     Note:    Oropharynx: oropharynx clear of all foreign objects and spontaneously breathing  Level of Consciousness: awake  Oxygen Supplementation: room air    Independent Airway: airway patency satisfactory and stable    Vital Signs Stable: post-procedure vital signs reviewed and stable  Report to RN Given: handoff report given  Patient transferred to: PACU    Handoff Report: Identifed the Patient, Identified the Reponsible Provider, Reviewed the pertinent medical history, Discussed the surgical course, Reviewed Intra-OP anesthesia mangement and issues during anesthesia, Set expectations for post-procedure period and Allowed opportunity for questions and acknowledgement of understanding      Vitals:  Vitals Value Taken Time   BP     Temp     Pulse     Resp     SpO2         Electronically Signed By: Lisa Sahu MD  2024  11:48 AM

## 2024-04-29 NOTE — PLAN OF CARE
Patient arrived to [facility] unit via zoom cart at 1450 ,with belongings, accompanied by family, with infant in  NICU . Received report from  ALBINO Shipman  and checked bands. Unit and room orientation completd. Call light given; no concerns present at this time. Continue with plan of care.

## 2024-04-29 NOTE — OP NOTE
[facility]  Full Operative Progress Note     Surgery Date:   2024     Surgeon:           Vianca Farmer MD      Assistants:       Tila Solis MD PGY3     Pre-op Diagnosis:           - Intrauterine pregnancy at 37w2d  - BMI 62  - History of prior c/s x1   - Gestational diabetes, unknown control   - History of H1N1 related pneumonia requiring tracheostomy       Post-op Diagnosis:         - Same   - Liveborn female infant      Procedure:        Repeat low-transverse  section with single layer uterine closure via pfannenstiel skin incision     Anesthesia:      Spinal + Tap block      EBL:     424 mL  IVF:       1121 mL crystalloid  UOP:    50 mL clear urine at the end of the case     Drains: Banegas Catheter     Specimens:      Routine cord blood      Complications:  None apparent     Findings:   Moderate recto fascial adhesions with thickened peritoneum. Filmy peritoneal adhesions to anterior uterus. Clear amniotic fluid. Liveborn female infant in vertex presentation. Apgars 7 at 1 minute & 9 at 5 minutes. Normal uterus, fallopian tubes, and ovaries    Procedure Details:   The patient was brought to the OR, where adequate spinal anesthesia was administered.  She was placed in the dorsal supine position. A Traxi panniculus retractor was placed. She was prepped and draped in the usual sterile fashion. A surgical time out was performed. A pfannenstiel skin incision was made with the scalpel, and carried down to the underlying fascia with sharp and blunt dissection. The fascia was incised in the midline, and the incision was extended laterally with the Carrasquillo scissors. The superior aspect of the fascia was grasped with the Kocher clamps and dissected off of the underlying rectus muscles with blunt and sharp dissection. Attention was then turned to the inferior aspect of the fascia, which was similarly dissected off of the underlying rectus muscles. The rectus muscles were   in the midline, and the peritoneum was entered bluntly, and the opening was extended with digital pressure. The bladder blade was placed. A transverse hysterotomy was made with the scalpel in the lower uterine segment, and the incision was extended with digital pressure. The infant was noted to be in the vertex position, and was delivered atraumatically. The shoulders delivered easily. The cord was doubly clamped and cut, and the infant was handed off to the awaiting nursery staff. A segment of cord was cut and sent. The placenta was delivered with gentle traction on the umbilical cord and uterine massage. The uterus was exteriorized and cleared of all clots and debris.  The hysterotomy was closed with a running locked suture of 0 Vicryl. The hysterotomy was noted to be hemostatic. The posterior cul-de-sac was cleared of all clots and debris. The uterus was returned to the abdomen. The pericolic gutters were cleared of all clots and debris. The hysterotomy was reexamined and a small area of oozing ntoed at the left lateral aspect. A figure of X suture using 0 Vicryl was placed. The hysterotomy was then noted to be hemostatic. The fascia and rectus muscles were examined and hemostatic. The fascia was closed with a running 0 looped PDS. The subcutaneous tissue was irrigated and areas of oozing were controlled with electrocautery. The subcutaneous tissue was closed with 3-0 Vicryl. The skin was closed with 4-0 Monocryl and covered with a sterile dressing.    All sponge, needle, and instrument counts were correct. The patient tolerated the procedure well, and was transferred to recovery in stable condition. Dr. Farmer was present and scrubbed for the entirety of the procedure.     Tila Solis MD   OBGYN resident PGY3  04/29/2024 11:59 AM     I was present and scrubbed for entire procedure.   Vianca Farmer MD

## 2024-04-29 NOTE — PLAN OF CARE
Data: Patient admitted to room 458 at 0636. Patient is a . Prenatal record reviewed.   OB History    Para Term  AB Living   2 1 1 0 0 1   SAB IAB Ectopic Multiple Live Births   0 0 0 0 1      # Outcome Date GA Lbr Elvin/2nd Weight Sex Type Anes PTL Lv   2 Current            1 Term 20 39w3d 13:19 / 02:30 4.14 kg (9 lb 2 oz) M CS-LTranv Gen, EPI  GABBI      Name: BETTYE BERRIOS      Apgar1: 5  Apgar5: 6   .  Medical History:   Past Medical History:   Diagnosis Date    Chronic hypertension     H/O tracheostomy     H1N1 influenza     Obesity     Pre-diabetes     Tobacco use    .  Gestational age 37w2d. Vital signs per doc flowsheet. Fetal movement present. Patient reports No chief complaint on file.   as reason for admission. Support persons mother present.  Action: RN obtained at 0634. Care of patient assumed at 0636. Verbal consent for EFM, external fetal monitors applied. Admission assessment completed. Patient and support persons educated on labor process. Patient instructed to report change in fetal movement, contractions, vaginal leaking of fluid or bleeding, abdominal pain, or any concerns related to the pregnancy to her nurse/physician. Patient oriented to room, call light in reach.   Response: Report give to Ramonita BARRETO RN. Patient verbalized understanding of education and verbalized agreement with plan.

## 2024-04-29 NOTE — PROGRESS NOTES
[facility]   Post-partum Progress Note    Name:  Gabby Phipps  MRN: [id number]    S: Patient is doing really well this morning, sitting up in bed with baby..  Pain is well controlled. Banegas just removed early this morning, not yet voiding. Having some general itching, would like to try something for this.. Tolerating regular diet without nausea or vomiting.  Not yet passing gas. Has not yet had a bowel movement. Only ambulated minimally..  Lochia is within expected limits.. Planning on bottle/formula feeding.      O:   Patient Vitals for the past 24 hrs:   BP Temp Temp src Pulse Resp SpO2   04/30/24 0157 -- -- -- -- -- 98 %   04/30/24 0045 126/62 98.1  F (36.7  C) Oral 74 20 99 %   04/30/24 0023 129/69 98.1  F (36.7  C) Oral 78 20 97 %   04/29/24 2027 136/55 -- -- 83 18 97 %   04/29/24 1941 137/67 97.5  F (36.4  C) Oral 82 18 97 %   04/29/24 1830 125/73 -- -- 63 18 96 %   04/29/24 1730 129/77 97.8  F (36.6  C) Oral 66 18 96 %   04/29/24 1632 128/54 -- Oral 80 22 --   04/29/24 1500 126/64 -- -- 64 -- 97 %   04/29/24 1430 123/62 97.6  F (36.4  C) Oral 69 22 97 %   04/29/24 1308 -- 97.7  F (36.5  C) Oral -- -- --   04/29/24 1305 121/72 -- -- 66 19 99 %   04/29/24 1300 -- -- -- 70 17 99 %   04/29/24 1255 -- -- -- 73 -- 100 %   04/29/24 1250 132/64 -- -- 68 15 97 %   04/29/24 1245 -- -- -- 70 13 96 %   04/29/24 1240 -- -- -- 66 18 99 %   04/29/24 1235 118/60 (!) 96.3  F (35.7  C) Axillary 68 -- 97 %   04/29/24 1230 -- -- -- 67 19 99 %   04/29/24 1225 -- -- -- 71 24 99 %   04/29/24 1220 119/75 -- -- 70 11 96 %   04/29/24 1215 -- -- -- 69 11 93 %   04/29/24 1210 -- -- -- 66 22 97 %   04/29/24 1205 127/56 -- -- 70 22 94 %   04/29/24 1200 119/51 -- -- 69 -- --   04/29/24 1146 108/58 -- -- 65 -- --   04/29/24 0810 -- 97.9  F (36.6  C) Oral -- -- --     Gen:  Resting comfortably, NAD  CV:  RR, well perfused  Pulm:  Non-labored breathing on room air  Abd:  Soft, appropriately  ttp, non-distended.Fundus just below umbilicus, firm and non-tender.   Incision:  overlying bandage is clean, dry, intact. No erythema, drainage or induration   Ext:  non-tender, trace edema to bilateral lower extremities    I/O last 3 completed shifts:  In: 1621 [I.V.:1621]  Out: 906 [Urine:300; Blood:606]    Assessment/Plan:  Gabby Phipps 28 year old  on POD #1 s/p scheduled RLTCS. Starting to meet early goals for discharge home    # Postpartum management  Pain: Well-controlled with ibuprofen, tylenol, and oxycodone PRN   GI:  PRN bowel regimen, anti-emetics  : Banegas just removed early this morning.   Hgb: 12.7>> AM Hemoglobin pending. Asymptomatic from blood loss anemia; will discharge with oral iron if <10.   Rh: Positive  Rubella: Equivocal. MMR ordered for PTD.   Feed: Formula feeding. Does not desire to breastfeed.   Infant:  Stable in room.    BC: Discussed, patient declines.Discussed recommended pregnancy spacing of 18 months.   PPx:  Encourage ambulation, IS, SCDs while confined to bed    # Chronic Hypertension  - blood pressures overnight  - asymptomatic    #BMI 62  - prophylactic Lovenox BID scheduled to start this morning pending hemoglobin    # GDM, unknown control  - fbg ordered and pending this morning    # Asthma  # Hx H1N1 pneumonia requiring tracheostomy  - albuterol/ventolin inhaler prn    # Tobacco use disorder  # THC use   - NRT available prn. Smokes 0.5 ppd  - SW consult    Dispo: Medically Ready for Discharge: Anticipated in 2-4 Days    Damaris Morton DO, MS  Obstetrics, Gynecology & Women's Health   Resident, PGY-3  2024 6:54 AM    Hemoglobin   Date Value Ref Range Status   2024 11.6 (L) 11.7 - 15.7 g/dL Final   2024 12.7 11.7 - 15.7 g/dL Final     Appreciate Dr. Morton's note above, patient also seen and examined by me. I agree with the note above.   Maricel Diaz MD

## 2024-04-29 NOTE — DISCHARGE SUMMARY
[facility] Discharge Summary    Gabby Phipps MRN# [id number]   Age: 28 year old YOB: 1995     Date of Admission:  2024  Date of Discharge:  2024     Admitting Physician:  Vianca Farmer MD  Discharge Physician:  Mikki Arias MD             Admission Diagnoses:    at 37w2d  H/o CS x1  BMI 62  Asthma with history of tracheostomy and prolonged H1N1 ()  cHTN  MR BP x1  Tobacco/THC use  Pre-diabetes  GDM, unknown control  Rubella equivocal          Discharge Diagnosis:   Same, now , delivered   Acute blood loss anemia              Procedures:     Procedure(s): Repeat low transverse  section with single layer closure via Pfannenstiel skin incision  Combined spinal/epidural anesthesia, TAP block                Medications Prior to Admission:     No medications prior to admission.             Discharge Medications:        Review of your medicines        START taking        Dose / Directions   acetaminophen 325 MG tablet  Commonly known as: TYLENOL  Used for: S/P  section, Morbid obesity (H)      Dose: 650 mg  Take 2 tablets (650 mg) by mouth every 6 hours as needed for mild pain Start after Delivery.  Quantity: 100 tablet  Refills: 0     hydrochlorothiazide 25 MG tablet  Commonly known as: HYDRODIURIL  Used for: Chronic hypertension      Dose: 25 mg  Take 1 tablet (25 mg) by mouth daily  Quantity: 10 tablet  Refills: 0     ibuprofen 600 MG tablet  Commonly known as: ADVIL/MOTRIN  Used for: S/P  section, Morbid obesity (H)      Dose: 600 mg  Take 1 tablet (600 mg) by mouth every 6 hours as needed for moderate pain Start after delivery  Quantity: 60 tablet  Refills: 0     * NIFEdipine ER 30 MG 24 hr tablet  Commonly known as: ADALAT CC  Used for: Chronic hypertension      Dose: 30 mg  Take 1 tablet (30 mg) by mouth 2 times daily  Quantity: 30 tablet  Refills: 3     * NIFEdipine ER 60 MG 24 hr tablet  Commonly known as: ADALAT CC  Used for:  Chronic hypertension      Dose: 60 mg  Take 1 tablet (60 mg) by mouth daily  Quantity: 30 tablet  Refills: 2     oxyCODONE 5 MG tablet  Commonly known as: ROXICODONE  Used for: S/P  section, Morbid obesity (H)      Dose: 5 mg  Take 1 tablet (5 mg) by mouth every 6 hours as needed for pain  Quantity: 6 tablet  Refills: 0     senna-docusate 8.6-50 MG tablet  Commonly known as: SENOKOT-S/PERICOLACE  Used for: S/P  section, Morbid obesity (H)      Dose: 1 tablet  Take 1 tablet by mouth daily Start after delivery.  Quantity: 100 tablet  Refills: 0           * This list has 2 medication(s) that are the same as other medications prescribed for you. Read the directions carefully, and ask your doctor or other care provider to review them with you.                CONTINUE these medicines which have NOT CHANGED        Dose / Directions   acetone urine test strip  Commonly known as: KETOSTIX  Used for: Diet controlled gestational diabetes mellitus (GDM) in third trimester      Check urine ketones when you wake up every morning for 7 days. If negative everyday, reduce testing to once a week.  Quantity: 50 strip  Refills: 1     * Ventolin  (90 Base) MCG/ACT inhaler  Generic drug: albuterol      Dose: 1-2 puff  Inhale 1-2 puffs into the lungs every 4 hours as needed  Refills: 0     * albuterol 1.25 MG/3ML neb solution  Commonly known as: ACCUNEB      Dose: 1.25 mg  Take 1.25 mg by nebulization every 6 hours as needed for shortness of breath, wheezing or cough  Refills: 0     alcohol swab prep pads  Used for: Diet controlled gestational diabetes mellitus (GDM) in third trimester      Use to swab area of injection/benjamin as directed.  Quantity: 100 each  Refills: 3     blood glucose monitoring meter device kit  Commonly known as: NO BRAND SPECIFIED  Used for: Diet controlled gestational diabetes mellitus (GDM) in third trimester      Use to test blood sugar 4 times daily or as directed. Preferred blood glucose  meter OR supplies to accompany: Blood Glucose Monitor Brands: per insurance.  Quantity: 1 kit  Refills: 0     * blood glucose test strip  Commonly known as: NO BRAND SPECIFIED  Used for: Diet controlled gestational diabetes mellitus (GDM) in third trimester      Use to test blood sugar 4 times daily or as directed. To accompany: Blood Glucose Monitor Brands: per insurance.  Quantity: 100 strip  Refills: 6     * blood glucose test strip  Commonly known as: NO BRAND SPECIFIED  Used for: Diet controlled gestational diabetes mellitus (GDM) in third trimester      Use to test blood sugar 4 times daily or as directed.  Quantity: 100 strip  Refills: 3     thin lancets  Commonly known as: NO BRAND SPECIFIED  Used for: Diet controlled gestational diabetes mellitus (GDM) in third trimester      Use with lanceting device. To accompany: Blood Glucose Monitor Brands: per insurance.  Quantity: 100 each  Refills: 6           * This list has 4 medication(s) that are the same as other medications prescribed for you. Read the directions carefully, and ask your doctor or other care provider to review them with you.                   Where to get your medicines        These medications were sent to [place], [place] - [address]  [address] [address]      Phone: [phone]   acetaminophen 325 MG tablet  ibuprofen 600 MG tablet  NIFEdipine ER 30 MG 24 hr tablet  NIFEdipine ER 60 MG 24 hr tablet  oxyCODONE 5 MG tablet  senna-docusate 8.6-50 MG tablet       [id number] - [address] AT [address] [address]      Phone: [phone]   hydrochlorothiazide 25 MG tablet               Consultations:   Anesthesia  Social Work  Endocrinology       Brief Admission History:   Ms. Gabby Phipps is a 28 year old now  who initially presented at 37w2d for scheduled repeat  .       Intraoperative course   The procedure was uncomplicated.   mL.  See operative report for details.     Findings:  Moderate recto fascial adhesions with thickened peritoneum. Filmy peritoneal adhesions to anterior uterus. Clear amniotic fluid. Liveborn female infant in vertex presentation. Apgars 7 at 1 minute & 9 at 5 minutes. Normal uterus, fallopian tubes, and ovaries.        Postpartum Course   The patient's hospital course was notable for mild range blood pressure on admission, notable for re-occurrence of her chronic hypertension (she notably had chronic hypertension which had resolved with weight loss). Laboratory evaluation was normal on admission and blood pressures were normotensive in the postpartum period.     She recovered as anticipated and experienced no post-operative complications. She was seen by endocrinology team postpartum and recommendation was made for continued lifestyle modifications and 2 hours gct at postpartum visit.  On discharge, her pain was well controlled. Vaginal bleeding is similar to peak menstrual flow.  Voiding without difficulty.  Ambulating well and tolerating a normal diet.  No fever or significant wound drainage.  Bottle feeding.  Infant is stable.  No bowel movement yet. She is Rh positive so Rhogam is not indicated.  She was discharged on post-partum day #2.    Post-partum hemoglobin:   Hemoglobin   Date Value Ref Range Status   2024 11.3 (L) 11.7 - 15.7 g/dL Final             Discharge Instructions and Follow-Up:     Discharge diet: Regular   Discharge activity: No lifting greater than 20 lbs, pushing, pulling, or other strenuous activity for 6 weeks. Pelvic rest for 6 weeks including no sexual intercourse, tampons, or douching. No driving until you can slam on the brakes without pain or while on narcotic pain medications.    Discharge follow-up: Follow up with primary OB for routine postpartum visit in 6 weeks  Incision check in 1-2 weeks.    Wound care: Keep incision clean and dry           Discharge Disposition:     Discharged to home in stable condition     Damaris Morton DO, MS  Obstetrics, Gynecology & Women's Health   Resident, PGY-3  05/02/2024 1:53 PM    The patient was seen and examined by me on the day of discharge.  I have reviewed and agree with the resident's above note.    Mary Pisano MD, FACOG

## 2024-04-29 NOTE — BRIEF OP NOTE
[facility]   Brief Operative Note     Surgery Date: 2024    Surgeon:  Vianca Farmer MD     Assistants:  Tila Solis MD PGY3    Pre-op Diagnosis:    - Intrauterine pregnancy at 37w2d  - Elevated BMI   - History of prior c/s x1   - Gestational diabetes, unknown control   - History of H1N1 related pneumonia requiring tracheostomy      Post-op Diagnosis:    - Same   - Liveborn female infant     Procedure:  Repeat low-transverse  section with single layer uterine closure via pfannenstiel skin incision    Anesthesia: Spinal + Tap block     EBL:  424 mL  IVF:  1121 mL crystalloid  UOP:  50 mL clear urine at the end of the case    Drains: Banegas Catheter    Specimens:  Routine cord blood     Complications:  None apparent    Findings:   Moderate recto fascial adhesions with thickened peritoneum. Filmy peritoneal adhesions to anterior uterus. Clear amniotic fluid. Liveborn female infant in vertex presentation. Apgars 7 at 1 minute & 9 at 5 minutes. Normal uterus, fallopian tubes, and ovaries.     Disposition:  Transferred in stable condition to [facility]    Tila Solis MD  OB/GYN Resident, PGY-3  2024 11:01 AM

## 2024-04-29 NOTE — PLAN OF CARE
Data: Gabby Phipps transferred to 71 via cart at 1410 following NICU visit. Baby remains in NICU.  Action: Receiving unit notified of transfer: Yes. Patient and family notified of room change. Report given to Chiquita at 1320. Belongings sent to receiving unit. Accompanied by Registered Nurse and family. Oriented patient to surroundings. Call light within reach. ID bands, VS, fundus, bleeding and incision/dressing double-checked with receiving RN.  Response: Patient tolerated transfer and is stable.

## 2024-04-29 NOTE — CONSULTS
"Consult received for Vascular access care.  See LDA for details.  For additional needs place \"Nursing to Consult for Vascular Access\" [id number] order in EPIC.  "

## 2024-04-29 NOTE — PLAN OF CARE
Pt here for scheduled repeat c section for gDM, cHTN. Pt 37/2. Brought to OR, delivery of viable female infant at 1038. NICU present for gDM. Apgars of 7 and 8. See note by NICU for their interventions, infant brought to NICU due to CPAP requirement. See procedure note from MD Farmer regarding procedure details.  mL. Anesthesia gave methergine x1 as ordered by Marleny. Brought to PACU following TAPS block. In recovery, VSS. Afebrile. Denied pain. Recovery  mL. See flowsheets for full assessment information. Family at bedside with patient.

## 2024-04-29 NOTE — PLAN OF CARE
Goal Outcome Evaluation:      Plan of Care Reviewed With: patient    Overall Patient Progress: improvingOverall Patient Progress: improving    Outcome Evaluation: keep patient informed    Data: VSS, postpartum assessments WNL. She currently has a carnes in place.  She has ambulated at the side of the bed.  She is eating and drinking without nausea. Incision is covered and unable to assess , no s/s infection. She plans to formula feed and does not want to pump.  Infant is in the NICU. Taking toradol and tylenol as ordered, but reports no pain.    Action: Education provided on discharge goals, plan of care, pain management  Response: Pt is agreeable with her plan of care. Pt is excited to go visit baby in the NICU again. Support person, friend, present. Anticipate discharge per care plan. Plan of care ongoing, no concerns as of present.

## 2024-04-30 LAB
GLUCOSE BLDC GLUCOMTR-MCNC: 95 MG/DL (ref 70–99)
GLUCOSE SERPL-MCNC: 95 MG/DL (ref 70–99)
HGB BLD-MCNC: 11.6 G/DL (ref 11.7–15.7)

## 2024-04-30 PROCEDURE — 250N000011 HC RX IP 250 OP 636

## 2024-04-30 PROCEDURE — 99232 SBSQ HOSP IP/OBS MODERATE 35: CPT | Mod: GC | Performed by: OBSTETRICS & GYNECOLOGY

## 2024-04-30 PROCEDURE — 82947 ASSAY GLUCOSE BLOOD QUANT: CPT

## 2024-04-30 PROCEDURE — 120N000002 HC R&B MED SURG/OB UMMC

## 2024-04-30 PROCEDURE — 90471 IMMUNIZATION ADMIN: CPT

## 2024-04-30 PROCEDURE — 36415 COLL VENOUS BLD VENIPUNCTURE: CPT

## 2024-04-30 PROCEDURE — 90707 MMR VACCINE SC: CPT

## 2024-04-30 PROCEDURE — 85018 HEMOGLOBIN: CPT

## 2024-04-30 PROCEDURE — 250N000013 HC RX MED GY IP 250 OP 250 PS 637

## 2024-04-30 RX ORDER — SODIUM CHLORIDE, SODIUM LACTATE, POTASSIUM CHLORIDE, CALCIUM CHLORIDE 600; 310; 30; 20 MG/100ML; MG/100ML; MG/100ML; MG/100ML
INJECTION, SOLUTION INTRAVENOUS
Status: DISPENSED
Start: 2024-04-30 | End: 2024-04-30

## 2024-04-30 RX ORDER — DIPHENHYDRAMINE HCL 25 MG
25 CAPSULE ORAL EVERY 6 HOURS PRN
Status: DISCONTINUED | OUTPATIENT
Start: 2024-04-30 | End: 2024-05-02 | Stop reason: HOSPADM

## 2024-04-30 RX ADMIN — IBUPROFEN 800 MG: 800 TABLET, FILM COATED ORAL at 20:04

## 2024-04-30 RX ADMIN — MEASLES, MUMPS, AND RUBELLA VIRUS VACCINE LIVE 0.5 ML: 1000; 12500; 1000 INJECTION, POWDER, LYOPHILIZED, FOR SUSPENSION SUBCUTANEOUS at 12:43

## 2024-04-30 RX ADMIN — IBUPROFEN 800 MG: 800 TABLET, FILM COATED ORAL at 12:42

## 2024-04-30 RX ADMIN — ACETAMINOPHEN 975 MG: 325 TABLET, FILM COATED ORAL at 04:56

## 2024-04-30 RX ADMIN — ACETAMINOPHEN 975 MG: 325 TABLET, FILM COATED ORAL at 12:41

## 2024-04-30 RX ADMIN — ACETAMINOPHEN 975 MG: 325 TABLET, FILM COATED ORAL at 20:03

## 2024-04-30 RX ADMIN — KETOROLAC TROMETHAMINE 30 MG: 30 INJECTION, SOLUTION INTRAMUSCULAR at 04:58

## 2024-04-30 RX ADMIN — SENNOSIDES AND DOCUSATE SODIUM 2 TABLET: 8.6; 5 TABLET ORAL at 12:41

## 2024-04-30 RX ADMIN — SIMETHICONE 80 MG: 80 TABLET, CHEWABLE ORAL at 20:12

## 2024-04-30 RX ADMIN — SENNOSIDES AND DOCUSATE SODIUM 2 TABLET: 8.6; 5 TABLET ORAL at 20:04

## 2024-04-30 RX ADMIN — SIMETHICONE 80 MG: 80 TABLET, CHEWABLE ORAL at 04:57

## 2024-04-30 RX ADMIN — ENOXAPARIN SODIUM 40 MG: 40 INJECTION SUBCUTANEOUS at 12:42

## 2024-04-30 ASSESSMENT — ACTIVITIES OF DAILY LIVING (ADL)
ADLS_ACUITY_SCORE: 24
ADLS_ACUITY_SCORE: 23
ADLS_ACUITY_SCORE: 23
ADLS_ACUITY_SCORE: 19
ADLS_ACUITY_SCORE: 23
ADLS_ACUITY_SCORE: 19
ADLS_ACUITY_SCORE: 19
ADLS_ACUITY_SCORE: 23
ADLS_ACUITY_SCORE: 23
ADLS_ACUITY_SCORE: 19
ADLS_ACUITY_SCORE: 19
ADLS_ACUITY_SCORE: 23
ADLS_ACUITY_SCORE: 19
ADLS_ACUITY_SCORE: 23
ADLS_ACUITY_SCORE: 23
ADLS_ACUITY_SCORE: 19
ADLS_ACUITY_SCORE: 18
ADLS_ACUITY_SCORE: 23
ADLS_ACUITY_SCORE: 19
ADLS_ACUITY_SCORE: 18
ADLS_ACUITY_SCORE: 23

## 2024-04-30 NOTE — PROGRESS NOTES
Anesthesia Postpartum  Section with Spinal Anesthesia    Patient: Gabby Phipps    Patient location: Postpartum floor    Chief complaint: Acute postoperative pain management s/p spinal anesthetic.    Procedure(s) Performed:  Procedure(s):   section    Anesthesia type: Spinal Block and Epidural    Subjective  Resting comfortably at this time. Pain adequately controlled by PO medications. Denies pruritis, weakness, paresthesias, difficulties breathing or voiding, headache, nausea, or vomiting. She is able to ambulate and tolerates a regular diet.     Objective  Respiratory Function (RR / SpO2 / Airway Patency): Satisfactory  Cardiac Function (HR / Rhythm / BP): Satisfactory  Strength and sensation lower extremities: Normal  Site of spinal/epidural insertion: No signs of infection or inflammation    Most recent vitals  /62 (BP Location: Left arm, Patient Position: Semi-Feldman's, Cuff Size: Adult Large)   Pulse 74   Temp 36.7  C (98.1  F) (Oral)   Resp 20   LMP 2023   SpO2 98%   Breastfeeding Unknown     Assessment and plan  Gabby Phipps is a 28 year old female  POD #1 s/p CA LIGATION,FALLOPIAN TUBE W/ [45424] ( SECTION, WITH BILATERAL SALPINGECTOMY)  CA REMOVAL OF FALLOPIAN TUBE [14139]  CA  W/ASSIST SPLIT [61573]  CA  DELIVERY ONLY [41833]  CA  DELIVERY+POSTPARTUM CARE [94781] with intrathecal 0.75% bupivacaine (1.6 ml), fentanyl (15mcg), and morphine (150mcg) and single shot TAP nerve block injections with bupivacaine 0.25% 10mL and long acting liposome bupivacaine (Exparel) 1.3% bilaterally. She is ambulating without difficulty without weakness or paresthesias. There is no evidence of adverse side effects associated with spinal or fascial plane block injections. The patient is receiving adequate incisional pain control at this time and anticipate up to 72 hours of incisional pain control. However, we further anticipate that  the patient may require opioid and non-opioid analgesics for visceral and muscle pain that is not controlled with local anesthetic.      In brief summary, her postoperative analgesia is adequately controlled today. Further interventional analgesic strategies would be of little utility at this time. Thus, we recommend proceeding with PO analgesics including staggered dosing of NSAIDs and acetaminophen with a taper of oxycodone.     Thank you for including us in the care for this patient. If there are any concerns please contact the department of anesthesia OB division ([phone]    Lisa Sahu MD CA-2   Department of Anesthesiology

## 2024-04-30 NOTE — CONSULTS
Diabetes CNS consult received for Gabby Phipps, 28 year old female,  s/p RLTCS on 24.  Viable female infant delivered via RLTCS with apgars of 7 and 9 at one and five minutes.    Pregnancy was notable for:   - elevated BMI  - History of prior C/S x1  - GDM with unknown control  - History of H1N1 related pneumonia requiring tracheostomy     Unknown control of GDM. Gabby did meet with Diabetes Educator on 3/29/24, but did not make it to subsequent visits. Per notes, appears that Gabby was not monitoring BGs and has a history of prediabetes that was not followed-up on.    Recent Labs   Lab 24  0703 24  0515 24  0745 24  0718 24  1557   GLC 95 95 126* 113* 79      Fasting glucoses within normal limits.     Recommendations:   Continue lifestyle modifications of healthy eating and being active to reduce risk.   2 hour glucose challenge at 6 week postpartum visit.   No medication at this time.     KEVIN Sher, CNS  Diabetes Educator  Pager: [phone]  Office: [phone]  Securely message with [facility]

## 2024-04-30 NOTE — PROVIDER NOTIFICATION
Patient urine output 150ml since 10am     Provider: IV access?    RN: Yes. LLR?    654pm Provider: We can do 500ml Bolus.

## 2024-04-30 NOTE — PROVIDER NOTIFICATION
04/30/24 0157   Provider Notification   Provider Name/Title Dr. Jameson   Method of Notification Phone   Request Evaluate-Remote   Notification Reason Status Update     Patient dropping O2 sats to upper 80s while sleeping and snoring. Repositioned with head up, sats still dropping intermittently. Sats 98 % when awake. O2 at 2L per nasal cannula applied.

## 2024-04-30 NOTE — PLAN OF CARE
Goal Outcome Evaluation:      Plan of Care Reviewed With: patient    Overall Patient Progress: improving     AFVSS. Patient is ambulating in room without difficulty.  MD was notified of concentrated urine of 40-50ml/ hr. Patient encouraged to drink fluids. Banegas catheter left in overnight per ok from MD. Banegas catheter removed at 0545 this am.She has not yet voided.  She had adequate urine output of about 50ml/hr.Encouraged patient to continue drinking fluids. She is tolerating foods and fluids without difficulty. She is not yet passing flatus. Patient also had episodes of O2 sats in upper 80s when sleeping/snoring, O2 applied per NC as charted. Sats remained % while sleeping. O2 removed when awake. O2 sats 97-99% on room air. Incision dressing C/D/I. Fundus firm with scant lochia. Patient medicated with toradol and tylenol as ordered. Patient denies pain. Baby returned from NICU this morning. Patient happy to have baby in room and is performing baby cares with assistance from friend. Will continue to provide support and education as needed. Continue present cares.

## 2024-04-30 NOTE — CONSULTS
"Social Work Initial Consult    DATA/ASSESSMENT    General Information  Assessment completed with:  Gabby Phipps  Type of visit: Initial Assessment   Reason for Consult: UA positive for THC, Pending cord tissue results of baby girl.     Gabby reports that this is her second child and she decided to name her baby girl [place]. Gabby reports that she wanted to stick with \"T names\" as her 3 year old son is Malcom.     Communication Style:  Spoken Language-English     Living Environment:   Gabby reports she resides in [place] and recently moved in with her Grandma. Gabby also has a 3 year old son named Malcom.     Family/Support Factors  Gabby reports she has a robust support system that includes her grandma, best friend Moraima, and her mother. Gabby reports that when she discharges home she feels confident she will be well supported at home.     Community Resources  Current Resources:  Gabby reports that she has needed resources for her baby and declined any further assistance with baby supplies from  or hospital.      Employment/Financial  Employment Status: unemployed   Financial Concerns: No financial concerns at this time were expressed by Gabby.    Mental Health Status:   SW facilitated conversation regarding mental health with Gabby. Gabby reports that she previously experienced PPD/Anxiety with her previous pregnancy but did not seek help. Gabby reports that some symptoms she experienced was not wanting to leave the home and not wanting to leave her baby. Gabby reports that she does not feel worried about PPD this time around as she is more knowledgeable on symptoms. Gabby's friend Moarima reports she feels this time around Gabby is completely different. Gabby reports that she previously saw a therapist when she was younger that was court ordered due to truancy, but did not continue that for a long period of time. SW discussed additional symptoms of PPD/Anxiety and " when to seek help. Gabby understood. No further mental health resources are requested.     Chemical Health:   SW informed Gabby of the pending cord tissue results for her baby and that was completed due to her marijuana use. Gabby reports that she used to be a heavy smoker and smoked a 1lb of marijuana a day, but when she found out she was pregnant she cut back tremendously and smoked about 1 gram a day. Gabby reports she has been smoking marijuana since she was 19 and finds that more helpful then taking any type of medication.     Additional Information:  SW informed Gabby of hospital policy for reporting marijuana use to CPS. Gabby understood. SW informed her that that process CPS usually takes with these types of reports and what to expect.      INTERVENTION  Conducted chart review.  Introduced SW role and scope of practice.   Orientation to the unit (parking, lodging, meals, visitation)  Provided assessment of patient and family's level of coping  Conducted psychosocial assessment   Referral to CPS (pending cord tissue results)  Provided SW contact info    PLAN    SW will continue to follow for supportive intervention.     VAZQUEZ Fragoso, SW  Maternal and Child Health   Office: [phone]  Cell: [phone]  After Hours Pager: [phone]  [email or web address]

## 2024-05-01 ENCOUNTER — TELEPHONE (OUTPATIENT)
Dept: OBGYN | Facility: CLINIC | Age: 29
End: 2024-05-01
Payer: COMMERCIAL

## 2024-05-01 LAB
ALT SERPL W P-5'-P-CCNC: 8 U/L (ref 0–50)
AST SERPL W P-5'-P-CCNC: 16 U/L (ref 0–45)
CREAT SERPL-MCNC: 0.77 MG/DL (ref 0.51–0.95)
EGFRCR SERPLBLD CKD-EPI 2021: >90 ML/MIN/1.73M2
ERYTHROCYTE [DISTWIDTH] IN BLOOD BY AUTOMATED COUNT: 13.1 % (ref 10–15)
HCT VFR BLD AUTO: 34 % (ref 35–47)
HGB BLD-MCNC: 11.3 G/DL (ref 11.7–15.7)
MCH RBC QN AUTO: 30.5 PG (ref 26.5–33)
MCHC RBC AUTO-ENTMCNC: 33.2 G/DL (ref 31.5–36.5)
MCV RBC AUTO: 92 FL (ref 78–100)
PLATELET # BLD AUTO: 196 10E3/UL (ref 150–450)
RBC # BLD AUTO: 3.7 10E6/UL (ref 3.8–5.2)
WBC # BLD AUTO: 10.9 10E3/UL (ref 4–11)

## 2024-05-01 PROCEDURE — 84450 TRANSFERASE (AST) (SGOT): CPT

## 2024-05-01 PROCEDURE — 250N000013 HC RX MED GY IP 250 OP 250 PS 637

## 2024-05-01 PROCEDURE — 250N000011 HC RX IP 250 OP 636

## 2024-05-01 PROCEDURE — 85027 COMPLETE CBC AUTOMATED: CPT

## 2024-05-01 PROCEDURE — 36415 COLL VENOUS BLD VENIPUNCTURE: CPT

## 2024-05-01 PROCEDURE — 120N000002 HC R&B MED SURG/OB UMMC

## 2024-05-01 PROCEDURE — 99232 SBSQ HOSP IP/OBS MODERATE 35: CPT | Mod: GC | Performed by: OBSTETRICS & GYNECOLOGY

## 2024-05-01 PROCEDURE — 84460 ALANINE AMINO (ALT) (SGPT): CPT

## 2024-05-01 PROCEDURE — 82565 ASSAY OF CREATININE: CPT

## 2024-05-01 RX ORDER — ACETAMINOPHEN 325 MG/1
650 TABLET ORAL EVERY 6 HOURS PRN
Qty: 100 TABLET | Refills: 0 | Status: SHIPPED | OUTPATIENT
Start: 2024-05-01

## 2024-05-01 RX ORDER — NIFEDIPINE 30 MG/1
30 TABLET, EXTENDED RELEASE ORAL DAILY
Status: DISCONTINUED | OUTPATIENT
Start: 2024-05-01 | End: 2024-05-01

## 2024-05-01 RX ORDER — AMOXICILLIN 250 MG
1 CAPSULE ORAL DAILY
Qty: 100 TABLET | Refills: 0 | Status: SHIPPED | OUTPATIENT
Start: 2024-05-01

## 2024-05-01 RX ORDER — NIFEDIPINE 30 MG/1
30 TABLET, EXTENDED RELEASE ORAL 2 TIMES DAILY
Status: DISCONTINUED | OUTPATIENT
Start: 2024-05-01 | End: 2024-05-02

## 2024-05-01 RX ORDER — IBUPROFEN 600 MG/1
600 TABLET, FILM COATED ORAL EVERY 6 HOURS PRN
Qty: 60 TABLET | Refills: 0 | Status: SHIPPED | OUTPATIENT
Start: 2024-05-01

## 2024-05-01 RX ORDER — OXYCODONE HYDROCHLORIDE 5 MG/1
5 TABLET ORAL EVERY 6 HOURS PRN
Qty: 6 TABLET | Refills: 0 | Status: SHIPPED | OUTPATIENT
Start: 2024-05-01 | End: 2024-05-04

## 2024-05-01 RX ADMIN — NIFEDIPINE 30 MG: 30 TABLET, FILM COATED, EXTENDED RELEASE ORAL at 10:39

## 2024-05-01 RX ADMIN — IBUPROFEN 800 MG: 800 TABLET, FILM COATED ORAL at 14:06

## 2024-05-01 RX ADMIN — IBUPROFEN 800 MG: 800 TABLET, FILM COATED ORAL at 20:10

## 2024-05-01 RX ADMIN — SENNOSIDES AND DOCUSATE SODIUM 1 TABLET: 8.6; 5 TABLET ORAL at 20:10

## 2024-05-01 RX ADMIN — IBUPROFEN 800 MG: 800 TABLET, FILM COATED ORAL at 07:55

## 2024-05-01 RX ADMIN — ACETAMINOPHEN 975 MG: 325 TABLET, FILM COATED ORAL at 07:54

## 2024-05-01 RX ADMIN — ACETAMINOPHEN 975 MG: 325 TABLET, FILM COATED ORAL at 14:06

## 2024-05-01 RX ADMIN — ACETAMINOPHEN 975 MG: 325 TABLET, FILM COATED ORAL at 20:10

## 2024-05-01 RX ADMIN — ENOXAPARIN SODIUM 40 MG: 40 INJECTION SUBCUTANEOUS at 00:17

## 2024-05-01 RX ADMIN — ACETAMINOPHEN 975 MG: 325 TABLET, FILM COATED ORAL at 01:48

## 2024-05-01 RX ADMIN — SENNOSIDES AND DOCUSATE SODIUM 2 TABLET: 8.6; 5 TABLET ORAL at 07:55

## 2024-05-01 RX ADMIN — ENOXAPARIN SODIUM 40 MG: 40 INJECTION SUBCUTANEOUS at 14:32

## 2024-05-01 RX ADMIN — IBUPROFEN 800 MG: 800 TABLET, FILM COATED ORAL at 01:48

## 2024-05-01 ASSESSMENT — ACTIVITIES OF DAILY LIVING (ADL)
ADLS_ACUITY_SCORE: 18

## 2024-05-01 NOTE — PLAN OF CARE
Goal Outcome Evaluation:      Plan of Care Reviewed With: patient    Overall Patient Progress: improvingOverall Patient Progress: improving     Data: VSS; watching Bps Q4, postpartum assessments WNL. She is voiding without difficulty, up ad easton, eating and drinking without nausea. Incision appears to be healing well, lochia WNL, no s/s infection. No intention to breastfeed or pump. Taking ibuprofen and tylenol as ordered; Pt reports good pain management.   Action: Education provided on discharge goals, plan of care, pain management  Response: Pt is agreeable with her plan of care. Pt is independent providing  cares and is attentive to infant needs. Support person,  Moraima, present. Anticipate discharge per care plan. Plan of care ongoing, no concerns as of present.

## 2024-05-01 NOTE — PROVIDER NOTIFICATION
05/01/24 0832   Provider Notification   Provider Name/Title Dr. Mikki Walsh G2   Method of Notification Electronic Page   Request Evaluate-Remote   Notification Reason Vital Signs Change     FYI: Pt had MMR yesterday. /83. 2nd one, 4/30 8 pm 144/56  SS  Mikki Walsh - 8:34 am  We are going to start nifed and repeat labs Hugo is coming to discuss with her and I'll place orders thanks

## 2024-05-01 NOTE — PLAN OF CARE
Goal Outcome Evaluation:      Plan of Care Reviewed With: patient, friend    Overall Patient Progress: improvingOverall Patient Progress: improving       Problem: Adult Inpatient Plan of Care  Goal: Optimal Comfort and Wellbeing  Outcome: Progressing     Problem: Adult Inpatient Plan of Care  Goal: Readiness for Transition of Care  Outcome: Progressing     Data: Vital signs within normal limits. Postpartum checks within normal limits - see flow record. Patient eating and drinking normally. Patient able to empty bladder independently and is up ambulating. No apparent signs of infection. Incision healing well. Dressing was removed and patient showered this evening.  Patient performing self cares and is able to care for infant.  Action: Patient medicated during the shift for pain. See MAR. Patient reassessed within 1 hour after each medication and pain was improved - patient stated she was comfortable. Patient education done about self care. See flow record.  Response: Positive attachment behaviors observed with infant. Support persons was present.   Plan: Anticipate discharge on Wednesday.

## 2024-05-01 NOTE — PLAN OF CARE
Goal Outcome Evaluation:      Plan of Care Reviewed With: patient    Overall Patient Progress: improving    Afebrile. Patient had one mild range elevated blood pressure last evening, subsequent  BPs WNL. Other VSS. Patient denies headache, visual changes or epigastric pain. She is ambulating in and outside of room without difficulty. She is voiding without difficulty. She is passing flatus and has had a BM.  Incision DELMER with steri strips in place. No s/s infection noted. Has interdry to pannus. Patient states incisional pain managed with scheduled tylenol and ibuprofen. Patient is bottle feeding formula to baby. Friend, Moraima at bedside and supportive. Patient working on completing birth certificate, and hoping to discharge to home today. Will continue to provide support and education as needed. Continue present cares.

## 2024-05-01 NOTE — PROVIDER NOTIFICATION
05/01/24 0041   Provider Notification   Provider Name/Title Dr. Obi Jameson   Method of Notification Phone  (text)   Request Evaluate-Remote   Notification Reason Vital Signs Change     Patient had mild range elevated BP of 144/56. Has chronic htn on problem list. Ill continue to assess VS every 4 hours. Please let me know if you'd like anything additional at this time.

## 2024-05-01 NOTE — PROGRESS NOTES
[facility]   Post-partum Progress Note    Name:  Gabby Phipps  MRN: [id number]    S: Patient is doing really well this morning. Pain is well controlled. Voiding spontaneously.  Tolerating regular diet without nausea or vomiting. Passing gas.  Has not yet had a bowel movement. Only ambulated minimally.  Lochia is within expected limits. Planning on bottle/formula feeding.  Would like to discharge home today.    O:   Patient Vitals for the past 24 hrs:   BP Temp Temp src Pulse Resp   24 1008 (!) 158/91 -- -- 81 20   24 0812 (!) 144/83 98.1  F (36.7  C) Oral 84 18   24 0430 134/72 98  F (36.7  C) Oral 79 20   24 0004 139/69 97.8  F (36.6  C) Oral 88 20   24 2004 (!) 144/56 98  F (36.7  C) Oral 87 20   24 1300 133/86 98  F (36.7  C) Oral 76 20     Gen:  Resting comfortably, NAD  CV:  RR, well perfused  Pulm:  Non-labored breathing on room air  Abd:  Soft, appropriately ttp, non-distended.Fundus just below umbilicus, firm and non-tender.   Incision:  overlying bandage is clean, dry, intact. No erythema, drainage or induration   Ext:  non-tender, trace edema to bilateral lower extremities    I/O last 3 completed shifts:  In: -   Out: 2300 [Urine:2300]    Assessment/Plan:  Gabby Phipps 28 year old  on POD #2 s/p scheduled RLTCS. Meeting goals for discharge home. Would like to go today.    # Postpartum management  Pain: Well-controlled with ibuprofen, tylenol, and oxycodone PRN   GI:  PRN bowel regimen, anti-emetics  : Banegas just removed early this morning.   Hgb: 12.7>> AM Hemoglobin pending. Asymptomatic from blood loss anemia; will discharge with oral iron if <10.   Rh: Positive  Rubella: Equivocal. MMR ordered for PTD.   Feed: Formula feeding. Does not desire to breastfeed.   Infant:  Stable in room.    BC: Discussed, patient declines.Discussed recommended pregnancy spacing of 18 months.   PPx:  Encourage  ambulation, IS, SCDs while confined to bed    # Chronic Hypertension  - MR BP x3 overnight and this morning.  Plan for D#1 Nifedipine 30 mg today.  HELLP labs drawn and normal.  No symptoms of preeclampsia.  Open to [facility] program, but does state plans postpartum followup closer to home in [place].  - asymptomatic    #BMI 62  - prophylactic Lovenox BID    # GDM, unknown control  - fbg 95  - 2hr GTT at 6 week pp visit     # Asthma  # Hx H1N1 pneumonia requiring tracheostomy  - albuterol/ventolin inhaler prn    # Tobacco use disorder  # THC use   - NRT available prn. Smokes 0.5 ppd  - SW consult    Dispo: Medically Ready for Discharge: Anticipate discharge in 1-3 days      Damaris Morton DO, MS  Obstetrics, Gynecology & Women's Health   Resident, PGY-3  05/01/2024 11:41 AM    Staff MD Note    I appreciate the note by Dr. Morton.  Any necessary changes have been made by me.  I saw and evaluated the patient and agree with the findings and plan of care as documented in the note.  Patient with elevated BP x 3 overnight and this morning.  Discussed will need to initiate medication and continue to monitor.  She understands although disappointed as was hoping to go home but understands reasoning for needing to stay.  Doing well postoperatively.  Plan discharge once BP stable on oral antihypertensive regimen.    Mikki Arias MD

## 2024-05-01 NOTE — PROVIDER NOTIFICATION
05/01/24 1400   Provider Notification   Provider Name/Title Dr Jesse Mckeon   Method of Notification Electronic Page   Request Evaluate-Remote   Notification Reason Status Update     Last 2 Bps have been good 135/77 and 111/65. Pt wanting to go, wanting to know discharge  plan. thanks

## 2024-05-02 VITALS
SYSTOLIC BLOOD PRESSURE: 136 MMHG | RESPIRATION RATE: 18 BRPM | WEIGHT: 293 LBS | BODY MASS INDEX: 63.64 KG/M2 | DIASTOLIC BLOOD PRESSURE: 73 MMHG | OXYGEN SATURATION: 99 % | HEART RATE: 80 BPM | TEMPERATURE: 98.1 F

## 2024-05-02 PROCEDURE — 250N000013 HC RX MED GY IP 250 OP 250 PS 637

## 2024-05-02 PROCEDURE — 99238 HOSP IP/OBS DSCHRG MGMT 30/<: CPT | Mod: GC | Performed by: OBSTETRICS & GYNECOLOGY

## 2024-05-02 PROCEDURE — 250N000011 HC RX IP 250 OP 636

## 2024-05-02 RX ORDER — NIFEDIPINE 30 MG/1
60 TABLET, EXTENDED RELEASE ORAL DAILY
Status: DISCONTINUED | OUTPATIENT
Start: 2024-05-02 | End: 2024-05-02 | Stop reason: HOSPADM

## 2024-05-02 RX ORDER — NIFEDIPINE 30 MG/1
30 TABLET, EXTENDED RELEASE ORAL EVERY EVENING
Status: DISCONTINUED | OUTPATIENT
Start: 2024-05-02 | End: 2024-05-02 | Stop reason: HOSPADM

## 2024-05-02 RX ORDER — NIFEDIPINE 30 MG
30 TABLET, EXTENDED RELEASE ORAL 2 TIMES DAILY
Qty: 30 TABLET | Refills: 3 | Status: SHIPPED | OUTPATIENT
Start: 2024-05-02

## 2024-05-02 RX ORDER — HYDROCHLOROTHIAZIDE 25 MG/1
25 TABLET ORAL DAILY
Qty: 10 TABLET | Refills: 0 | Status: SHIPPED | OUTPATIENT
Start: 2024-05-02

## 2024-05-02 RX ADMIN — IBUPROFEN 800 MG: 800 TABLET, FILM COATED ORAL at 02:31

## 2024-05-02 RX ADMIN — IBUPROFEN 800 MG: 800 TABLET, FILM COATED ORAL at 08:35

## 2024-05-02 RX ADMIN — NIFEDIPINE 60 MG: 30 TABLET, FILM COATED, EXTENDED RELEASE ORAL at 08:35

## 2024-05-02 RX ADMIN — NIFEDIPINE 30 MG: 30 TABLET, FILM COATED, EXTENDED RELEASE ORAL at 00:19

## 2024-05-02 RX ADMIN — ENOXAPARIN SODIUM 40 MG: 40 INJECTION SUBCUTANEOUS at 00:19

## 2024-05-02 RX ADMIN — SENNOSIDES AND DOCUSATE SODIUM 2 TABLET: 8.6; 5 TABLET ORAL at 08:35

## 2024-05-02 RX ADMIN — ACETAMINOPHEN 975 MG: 325 TABLET, FILM COATED ORAL at 02:31

## 2024-05-02 RX ADMIN — ACETAMINOPHEN 975 MG: 325 TABLET, FILM COATED ORAL at 08:35

## 2024-05-02 ASSESSMENT — ACTIVITIES OF DAILY LIVING (ADL)
ADLS_ACUITY_SCORE: 18

## 2024-05-02 NOTE — PLAN OF CARE
Data: Vital signs stable, postpartum assessments within normal limits.   Eating and drinking without nausea or vomiting.  Up ad easton, and voiding without difficulty. Passing gas/BM.  Feeding baby independently-  formula  Pain managed with tylenol and ibuprofen. Pt states she is comfortable.  Incision appears to be healing well, no s/s infection.  Discharge outcomes on care plan met.   Action: Review of care plan, teaching, and discharge instructions done. Discharge medications provided, Pt verified they are correct/matched name, verbalized understanding of administration instructions. Blood pressure cuff at home.   Response: Patient states understanding and comfort with her discharge instructions, discharge medications, and plan of care. All questions addressed. She will discharge home.Goal Outcome Evaluation:      Plan of Care Reviewed With: patient    Overall Patient Progress: improvingOverall Patient Progress: improving

## 2024-05-02 NOTE — DISCHARGE INSTRUCTIONS
Warning Signs after Having a Baby    Keep this paper on your fridge or somewhere else where you can see it.    Call your provider if you have any of these symptoms up to 12 weeks after having your baby.    Thoughts of hurting yourself or your baby  Pain in your chest or trouble breathing  Severe headache not helped by pain medicine  Eyesight concerns (blurry vision, seeing spots or flashes of light, other changes to eyesight)  Fainting, shaking or other signs of a seizure    Call 9-1-1 if you feel that it is an emergency.     The symptoms below can happen to anyone after giving birth. They can be very serious. Call your provider if you have any of these warning signs.    My provider s phone number: _______________________    Losing too much blood (hemorrhage)    Call your provider if you soak through a pad in less than an hour or pass blood clots bigger than a golf ball. These may be signs that you are bleeding too much.    Blood clots in the legs or lungs    After you give birth, your body naturally clots its blood to help prevent blood loss. Sometimes this increased clotting can happen in other areas of the body, like the legs or lungs. This can block your blood flow and be very dangerous.     Call your provider if you:  Have a red, swollen spot on the back of your leg that is warm or painful when you touch it.   Are coughing up blood.     Infection    Call your provider if you have any of these symptoms:  Fever of 100.4 F (38 C) or higher.  Pain or redness around your stitches if you had an incision.   Any yellow, white, or green fluid coming from places where you had stitches or surgery.    Mood Problems (postpartum depression)    Many people feel sad or have mood changes after having a baby. But for some people, these mood swings are worse.     Call your provider right away if you feel so anxious or nervous that you can't care for yourself or your baby.    Preeclampsia (high blood pressure)    Even if you  didn't have high blood pressure when you were pregnant, you are at risk for the high blood pressure disease called preeclampsia. This risk can last up to 12 weeks after giving birth.     Call your provider if you have:   Pain on your right side under your rib cage  Sudden swelling in the hands and face    Remember: You know your body. If something doesn't feel right, get medical help.     For informational purposes only. Not to replace the advice of your health care provider. Copyright 2020 [facility]. All rights reserved. Clinically reviewed by Emma Diaz, RNC-OB, MSN. [employer] [id number] - Rev 02/23.    Checking Your Blood Pressure at Home  During and after pregnancy  How do I measure my blood pressure?  It s important to take the readings at the same time each day, such as morning and evening. Take your blood pressure before taking any morning medications.  How to get the most accurate reading  30 minutes before checking your blood pressure, avoid the following:  Drinking caffeine  Drinking alcohol  Eating  Smoking  Exercising  5 minutes before checking your blood pressure:  Use the bathroom and urinate so you have an empty bladder.  Sit still in a chair for around 5 minutes. Stay calm and relaxed and do not talk if possible.  To check your blood pressure:     Sit up straight in a chair.  Place your feet on the floor. Don t cross your ankles or legs.  Rest your arm at the level of your heart on a table or desk or on the arm of a chair. Use the same arm every day.  Pull up your shirt sleeve. Don t take the measurement over clothes.  Wrap the blood pressure cuff around the upper part of your left arm, 1 inch (2.5 cm) above your elbow.  Fit the cuff snugly around your arm. You should be able to place only one finger between the cuff and your arm.  Position the cord so that it rests in the bend of your elbow.  Press the power button.  Sit quietly while the cuff inflates and deflates.  Read the  digital reading on the monitor screen and write the numbers down (record them) in a notebook.  Wait 2-3 minutes, then repeat the steps, starting at step 1.  Which features do you need?  Arm cuff monitors give the most exact readings.  Wrist and finger blood pressure monitors are often less exact.  Pick a blood pressure monitor that has passed tests to show they measure exactly. Blood pressure cuffs for sale in the [place] that have passed tests are listed on the website www.validatebp.org.  Some monitors that have passed tests are:  Omron 3 Series Upper Arm Blood Pressure Monitor (Model GQ0137)  Omron 5 Series Upper Arm Blood Pressure Monitor (Model UB9704)  Omron 7 Series Upper Arm Blood Pressure Monitor (Model HEM-7320)  [employer] Upper Arm Blood Pressure Monitor with Talking Function (UA 1030T)  Don t use smartphone apps. There are many smartphone apps that claim to check your blood pressure using the pulse in your wrist or finger. These don t work. They haven t passed any tests. Don t give your clinic a blood pressure reading from a smartphone rick.  If you have a flexible spending account (FSA) or health savings account (HSA), you may wish to pay yourself back (reimburse) for the machine and cuff. A blood pressure monitor is an allowed over-the- counter (OTC) item to pay yourself back from these accounts.  Cuff size  The size of the arm cuff is a key feature. Make sure the cuff is the right size for your arm. If the cuff isn t the right size, readings will either be too high or low.  To know what size cuff to buy, measure the distance around your bicep (upper arm).  Use a flexible measuring tape or . Place the measuring tape long-term between your armpit and elbow. Measure the distance around your arm in inches.  You may need to buy a cuff apart from the machine to get the right size.  Cuff sizes and arm measurements  Small adult: 22 to 26 cm (8.7 to 10.2 inches)  Adult: 27 to 34 cm (10.6 to 13.4  "inches)  Large adult: 35 to 44 cm (13.8 to 17.3 inches)  Adult thigh: 45 to 52 cm (17.7 to 20.5 inches)    Copyright statement\" content=\"For informational purposes only. Not to replace the advice of your health care provider. Photo: ID [id number]   Andrea  [employer]. Text copyright   2023 [facility]. All rights reserved. Clinically reviewed by [facility]. [employer] [id number] - REV 03/23.    Know Your Blood Pressure Numbers  For patients who've had a high blood pressure disorder of pregnancy  What to know about high blood pressure disorders of pregnancy  People who had high blood pressure during pregnancy may continue to have high blood pressure for up to 12 weeks after pregnancy. It can also raise your lifetime risk of chronic high blood pressure, heart disease and blood vessel disease. It is vital that you keep monitoring your blood pressure and taking steps to control it.   The general guidelines below are what your blood pressures mean.  A heart healthy lifestyle that includes blood pressure control can help reduce these risks. A good blood pressure goal is less than 130/80 mmHg long-term.  Talk to your provider about high blood pressure disorder of pregnancy and what this means for your lifelong health.   Know your numbers  Read \"Checking Your Blood Pressure at Home\" to learn the best way to take your blood pressure.  Refer to the next page for general guidelines about what your blood pressures mean and what to do about them. Follow these instructions unless your provider tells you something different.  For more resources, visit www.preeclampsia.org.  What to do if your blood pressure is high  Act right away if you have numbers in the yellow or red range--don't wait for a scheduled appointment.  When to call your provider  Regardless of your blood pressure, call your healthcare provider right away if you develop any of these symptoms:  Severe headache  Chest " "pain  Trouble breathing  Stomach pain  Changes in vision  Swelling in your hands and face.  Please say, \"I am having symptoms of high blood pressure. My provider told me to call and ask to be seen right away when I have these symptoms.\"  If you ARE pregnant OR   it has been less than 12 weeks since you delivered  Systolic pressure (top number) is....  Diastolic (bottom number) is.... Your blood pressure is....   160 or higher  or 110 or higher VERY HIGH. Check it again in 10 minutes, then contact your provider.   140 - 159  or 90 - 109 HIGH. Keep checking blood pressure 2 times a day. If your blood pressure is in this range for 2 readings, contact your provider within 24 hours. We will discuss starting or increasing your blood pressure medicine.   100 - 139  and 60 - 89 NORMAL. Your blood pressure looks great!   Keep checking it 2 times a day.   Less than 100  or Less than 60 LOW. Check your blood pressure again in   10 minutes, then contact your provider. We may need to make changes to your blood pressure medicine.     Call your provider right away if you have these symptoms: a severe headache, vision changes, shortness of breath, chest pain, or right upper belly pain. Call even if your blood pressure is okay.  Call 9-1-1 if you feel the symptoms are severe and that it is an emergency.   If you are NOT pregnant OR   it has been more than 12 weeks since you delivered  Systolic pressure (top number) is....  Diastolic (bottom number) is.... Your blood pressure is....   Higher than 180 and/or Higher than 120 Hypertensive crisis. Call your doctor right away.   140 or higher or  90 or higher Hypertension Stage 2. Follow up with your provider.   130-139 or Less than 80 Hypertension Stage 1. Follow up with your provider.   120-129 and Less than 80 Elevated blood pressure (pre-hypertension). You are at higher risk of developing high blood pressure. Follow up with your provider.   Less than 120 and Less than 80 Normal.     " Call your provider right away if you have these symptoms: a severe headache, vision changes, shortness of breath, chest pain, or right upper belly pain. Call even if your blood pressure is okay.  Call 9-1-1 if you feel the symptoms are severe and that it is an emergency.   For informational purposes only. Not to replace the advice of your health care provider. Copyright   2023 [facility]. All rights reserved. Clinically reviewed by [facility]. [employer] [id number] - 03/23.

## 2024-05-02 NOTE — PROVIDER NOTIFICATION
05/02/24 0244   Provider Notification   Provider Name/Title Dr. Ortega   Method of Notification Electronic Page   Request Evaluate-Remote   Notification Reason Status Update     S: Patient's /80, pulse 80  B: Nifedipine titrated up to 30 mg BID, 2nd dose administered during this shift  A: BP remains 140's / 80's. Patient denies s/s of pre-e.   R: Provider read message at 02:44, no further recommendations at this time. Will continue to assess.     Addendum-Provider recommended that nifedipine be titrated to 60mg in the morning and 30 mg in the evening.

## 2024-05-02 NOTE — PLAN OF CARE
Problem: Postpartum ( Delivery)  Goal: Successful Parent Role Transition  Outcome: Progressing     Problem: Postpartum ( Delivery)  Goal: Effective Bowel Elimination  Outcome: Progressing     Problem: Postpartum ( Delivery)  Goal: Fluid and Electrolyte Balance  Outcome: Progressing     Problem: Postpartum ( Delivery)  Goal: Hemostasis  Outcome: Progressing   Goal Outcome Evaluation:      VSS with the exception of elevated BP's 140's/70s -80's. See provider notifications for additional information. Patient denies s/s of pre-e. Post partum assessments WNL, fundus is firm, midline and 1 below U. Patient is voiding spontaneously. She is OOB frequently and ambulating independently. Tolerating normal diet. Bonding well with infant and attentive to infant's needs. Friend is bedside and supportive. Will continue to assess.

## 2024-05-02 NOTE — PROVIDER NOTIFICATION
05/01/24 2310   Provider Notification   Provider Name/Title Dr. Jameson   Method of Notification Electronic Page   Request Evaluate-Remote   Notification Reason Status Update     S: Patient's /71 pulse 83  B: Patient has had several mid range BP's since admission onto unit, nifedipine 30 mg initiated this afternoon. BP's continue to be 140's/70's-80's.   A: /71, pulse 83, no s/s of pre-e. Writer noted no hypertension order sets in patient's chart.  R: Provider recommended titrating nifedipine to 30 mg BID. Provider stated ok with not having hypertension order sets in place.

## 2024-05-02 NOTE — PROGRESS NOTES
[facility]   Post-partum Progress Note    Name:  Gabby Phipps  MRN: [id number]    S: Patient is doing well this morning. Pain is well controlled. Voiding spontaneously.  Tolerating regular diet without nausea or vomiting. Passing gas.  Has not yet had a bowel movement. Only ambulated minimally.  Lochia is within expected limits. Planning on bottle/formula feeding. Denies chest pain, SOB, headache, blurry or double vision, scotoma, or RUQ pain. Again expresses a desire to discharge home today. Planning on follow up with PCP in [place].     O:   Patient Vitals for the past 24 hrs:   BP Temp Temp src Pulse Resp Weight   24 0603 133/77 -- -- -- 18 --   24 0555 -- -- -- -- -- (!) 187.1 kg (412 lb 6.4 oz)   24 0231 (!) 140/80 97.8  F (36.6  C) Oral -- 17 --   24 2221 (!) 141/71 98.3  F (36.8  C) Oral -- 18 --   24 1800 (!) 150/78 98.4  F (36.9  C) Oral 83 20 --   24 1406 111/65 -- -- 73 -- --   24 1225 135/77 -- -- 79 -- --   24 1008 (!) 158/91 -- -- 81 20 --   24 0812 (!) 144/83 98.1  F (36.7  C) Oral 84 18 --     Gen:  Resting comfortably, NAD  CV:  RR, well perfused  Pulm:  Non-labored breathing on room air  Abd:  Soft, appropriately ttp, non-distended.Fundus just below umbilicus, firm and non-tender.   Incision:  Appears clean, dry, intact. No erythema, drainage or induration   Ext:  non-tender, trace edema to bilateral lower extremities    No intake/output data recorded.    Assessment/Plan:  Gabby Phipps 28 year old  on POD #3 s/p scheduled RLTCS. Meeting goals for discharge home, and would like to go home today. Feels like she has good support at home (family member who is a nurse, comfortability with taking and managing blood pressure, follow up care).     # Postpartum management  Pain: Well-controlled with ibuprofen, tylenol, and oxycodone PRN   GI:  PRN bowel regimen, anti-emetics  : Banegas just  removed early this morning.   Hgb: 12.7>> AM Hemoglobin pending. Asymptomatic from blood loss anemia; will discharge with oral iron if <10.   Rh: Positive  Rubella: Equivocal. S/p MMR yesterday  Feed: Formula feeding. Does not desire to breastfeed.   Infant:  Stable in room.    BC: Discussed, patient declines.Discussed recommended pregnancy spacing of 18 months.   PPx:  Encourage ambulation, IS, SCDs while confined to bed. Ppx lvx     # Chronic Hypertension  - Blood pressures mild range overnight, most recent normotensive.   - D#1 Nifedipine 60mg/30mg. S/p 2D Nifedipine 30 mg  - HELLP normal. No symptoms of preeclampsia. Open to [facility] program, but does state plans postpartum followup closer to home in [place].  - Patient does need blood pressure cuff, ordered.     #BMI 62  - prophylactic Lovenox BID    # GDM, unknown control  - fbg 95  - 2hr GTT at 6 week pp visit     # Asthma  # Hx H1N1 pneumonia requiring tracheostomy  - albuterol/ventolin inhaler prn    # Tobacco use disorder  # THC use   - NRT available prn. Smokes 0.5 ppd  - SW consult    Dispo: Medically Ready for Discharge: Anticipated Today      Damaris Morton DO, MS  Obstetrics, Gynecology & Women's Health   Resident, PGY-3  05/02/2024 6:27 AM    /73 (BP Location: Left arm, Patient Position: Semi-Feldman's, Cuff Size: Adult Large)   Pulse 80   Temp 98.1  F (36.7  C) (Oral)   Resp 18   Wt (!) 187.1 kg (412 lb 6.4 oz)   LMP 08/12/2023   SpO2 99%   Breastfeeding Unknown   BMI 63.64 kg/m      No daily weights recorded    The patient was seen and examined by me separately from the team.  I have reviewed and agree with the above note.  She is doing well today, very much wants to go home.  Notes edema in her lower abdominal wall-states it was there at the end of pregnancy as well, wondering about a diuretic.  Her incision is c/d/I, small amount of subcutaneous edema above the incision that is likely just related to tissue injury from  her c/s.  Discussed that in the setting of her HTN requiring medication, adding hydrochlorothiazide 25 mg for 10 days is reasonable as it will augment the nifedipine and help resolve her edema more quickly.  She is agreeable to enrolling in [facility].  She will likely do her postpartum visit with her primary clinic in [place].      Mary Pisano MD, FACOG

## 2024-05-22 ENCOUNTER — TELEPHONE (OUTPATIENT)
Dept: OBGYN | Facility: CLINIC | Age: 29
End: 2024-05-22
Payer: COMMERCIAL

## 2024-06-04 ENCOUNTER — DOCUMENTATION ONLY (OUTPATIENT)
Dept: MATERNAL FETAL MEDICINE | Facility: CLINIC | Age: 29
End: 2024-06-04
Payer: COMMERCIAL

## 2024-07-23 ENCOUNTER — TELEPHONE (OUTPATIENT)
Dept: PULMONOLOGY | Facility: CLINIC | Age: 29
End: 2024-07-23
Payer: COMMERCIAL

## 2024-07-23 NOTE — TELEPHONE ENCOUNTER
VM box full, unable to LVM.    Appointment type: RTA  Provider: KRISHAN  Return date: 10/2/24  Specialty phone number: [phone]  Additional appointment(s) needed: FPFT, CHEST CT   Additonal Notes: N/A

## 2024-07-29 NOTE — TELEPHONE ENCOUNTER
Patient Contacted for the patient to call back and schedule the following:    Appointment type: RTA  Provider: KRISHAN  Return date: 9/20/24  Specialty phone number: [phone]  Additional appointment(s) needed: FPFT, CHEST CT   Additonal Notes: N/A

## 2025-03-16 ENCOUNTER — HEALTH MAINTENANCE LETTER (OUTPATIENT)
Age: 30
End: 2025-03-16

## (undated) DEVICE — PACK C-SECTION LF PL15OTA83B

## (undated) DEVICE — ESU PENCIL W/SMOKE EVAC NEPTUNE STRYKER 0703-046-000

## (undated) DEVICE — SOL NACL 0.9% IRRIG 1000ML BOTTLE 07138-09

## (undated) DEVICE — RETR PANNICULUS TRAXI FOR PT POSITIONING PRS-1030

## (undated) DEVICE — PREP CHLORAPREP 26ML TINTED ORANGE  260815

## (undated) DEVICE — STRAP KNEE/BODY 31143004

## (undated) DEVICE — DRSG STERI STRIP 1/4X3" R1541

## (undated) DEVICE — CATH TRAY FOLEY SURESTEP 16FR WDRAIN BAG STLK LATEX A300316A

## (undated) DEVICE — SOL WATER IRRIG 1000ML BOTTLE 07139-09

## (undated) DEVICE — DRSG ABDOMINAL 07 1/2X8" 7197D

## (undated) DEVICE — STOCKING SLEEVE COMPRESSION CALF LG

## (undated) RX ORDER — KETOROLAC TROMETHAMINE 30 MG/ML
INJECTION, SOLUTION INTRAMUSCULAR; INTRAVENOUS
Status: DISPENSED
Start: 2024-04-29

## (undated) RX ORDER — PHENYLEPHRINE HCL IN 0.9% NACL 50MG/250ML
PLASTIC BAG, INJECTION (ML) INTRAVENOUS
Status: DISPENSED
Start: 2024-04-29

## (undated) RX ORDER — EPINEPHRINE 1 MG/ML
INJECTION, SOLUTION, CONCENTRATE INTRAVENOUS
Status: DISPENSED
Start: 2024-04-29

## (undated) RX ORDER — BUPIVACAINE HYDROCHLORIDE 2.5 MG/ML
INJECTION, SOLUTION EPIDURAL; INFILTRATION; INTRACAUDAL
Status: DISPENSED
Start: 2024-04-29

## (undated) RX ORDER — ONDANSETRON 2 MG/ML
INJECTION INTRAMUSCULAR; INTRAVENOUS
Status: DISPENSED
Start: 2024-04-29

## (undated) RX ORDER — FENTANYL CITRATE-0.9 % NACL/PF 10 MCG/ML
PLASTIC BAG, INJECTION (ML) INTRAVENOUS
Status: DISPENSED
Start: 2024-04-29

## (undated) RX ORDER — OXYTOCIN/0.9 % SODIUM CHLORIDE 30/500 ML
PLASTIC BAG, INJECTION (ML) INTRAVENOUS
Status: DISPENSED
Start: 2024-04-29

## (undated) RX ORDER — FENTANYL CITRATE 50 UG/ML
INJECTION, SOLUTION INTRAMUSCULAR; INTRAVENOUS
Status: DISPENSED
Start: 2024-04-29

## (undated) RX ORDER — MORPHINE SULFATE 1 MG/ML
INJECTION, SOLUTION EPIDURAL; INTRATHECAL; INTRAVENOUS
Status: DISPENSED
Start: 2024-04-29